# Patient Record
Sex: FEMALE | Race: WHITE | Employment: UNEMPLOYED | URBAN - METROPOLITAN AREA
[De-identification: names, ages, dates, MRNs, and addresses within clinical notes are randomized per-mention and may not be internally consistent; named-entity substitution may affect disease eponyms.]

---

## 2017-08-28 ENCOUNTER — HOSPITAL ENCOUNTER (OUTPATIENT)
Dept: MAMMOGRAPHY | Age: 62
Discharge: HOME | End: 2017-08-28
Attending: OBSTETRICS & GYNECOLOGY
Payer: COMMERCIAL

## 2017-08-28 DIAGNOSIS — Z12.31 VISIT FOR SCREENING MAMMOGRAM: ICD-10-CM

## 2017-08-28 PROCEDURE — 77067 SCR MAMMO BI INCL CAD: CPT

## 2017-10-13 ENCOUNTER — HOSPITAL ENCOUNTER (OUTPATIENT)
Dept: ULTRASOUND IMAGING | Age: 62
Discharge: HOME | End: 2017-10-13
Attending: FAMILY MEDICINE
Payer: COMMERCIAL

## 2017-10-13 DIAGNOSIS — E04.1 THYROID NODULE: ICD-10-CM

## 2017-10-13 PROCEDURE — 76536 US EXAM OF HEAD AND NECK: CPT

## 2017-10-16 NOTE — PROGRESS NOTES
Per Dr. Karlos Souza: Nodule is stable. Still would be good idea to follow up with specialist for needle bx.  [address] [facility].- [facility]

## 2018-09-13 ENCOUNTER — HOSPITAL ENCOUNTER (OUTPATIENT)
Dept: MAMMOGRAPHY | Age: 63
Discharge: HOME | End: 2018-09-13
Attending: OBSTETRICS & GYNECOLOGY
Payer: COMMERCIAL

## 2018-09-13 DIAGNOSIS — Z12.31 VISIT FOR SCREENING MAMMOGRAM: ICD-10-CM

## 2018-09-13 PROCEDURE — 77067 SCR MAMMO BI INCL CAD: CPT

## 2018-09-21 NOTE — PROGRESS NOTES
Notify pt (my chart, letter or call and document in connect care) :  MMG is normal.  Continue monthly SBE, notify us if she has any concerns. Thanks.

## 2019-01-29 ENCOUNTER — HOSPITAL ENCOUNTER (OUTPATIENT)
Dept: MAMMOGRAPHY | Age: 64
Discharge: HOME | End: 2019-01-29
Attending: OBSTETRICS & GYNECOLOGY
Payer: COMMERCIAL

## 2019-01-29 DIAGNOSIS — N95.1 MENOPAUSAL STATE: ICD-10-CM

## 2019-01-29 DIAGNOSIS — Z87.39 HISTORY OF OSTEOPENIA: ICD-10-CM

## 2019-01-29 DIAGNOSIS — Z01.419 WELL WOMAN EXAM WITH ROUTINE GYNECOLOGICAL EXAM: ICD-10-CM

## 2019-01-29 PROCEDURE — 77080 DXA BONE DENSITY AXIAL: CPT

## 2019-02-21 NOTE — PROGRESS NOTES
Notify pt dexa shows  Osteopenia at spine and hips, spine -->2% decrease c/w last scan & hips 7% decrease c/w last dexa. R dexa 2021. Verify she is taking:  Calcium 1200 mg/d and vit D 800 -1000 IU/d, and qd weight bearing exercise, repeat dexa in 2 y. Results are not severe enough to warrant treatment h/w if she wants to see a rheumatologist for their opinion we can refer her. Thanks.

## 2019-04-29 ENCOUNTER — HOSPITAL ENCOUNTER (OUTPATIENT)
Dept: PHYSICAL THERAPY | Age: 64
Discharge: HOME | End: 2019-04-29
Attending: FAMILY MEDICINE
Payer: COMMERCIAL

## 2019-04-29 DIAGNOSIS — R10.32 LEFT GROIN PAIN: ICD-10-CM

## 2019-04-29 PROCEDURE — 97110 THERAPEUTIC EXERCISES: CPT

## 2019-04-29 PROCEDURE — 97162 PT EVAL MOD COMPLEX 30 MIN: CPT

## 2019-04-29 NOTE — PROGRESS NOTES
Deo Villa  : 1955  Payor: Angela Lisa / Plan: [address] HMO / Product Type: HMO /  [address] [place] [address] at [address] [place] [address]. [address] [facility] [address] [place] [address], [address] [facility], [address] [place] [address]  [phone]   [phone]                                                          Yanci Cummings MD      OUTPATIENT PHYSICAL THERAPY: Daily Treatment Note 2019 Visit Count:  1    Pre-treatment Symptoms/Complaints:  See initial evaluation  Pain: Initial:   0 Post Session:  0/10   Medications Last Reviewed:  2019   Updated Objective Findings:  see initial evaluation   TREATMENT:   THERAPEUTIC EXERCISE: (20 minutes):  Exercises per grid below to improve mobility, strength and balance. Required minimal visual, verbal and manual cues to promote proper body alignment and promote proper body posture. Progressed resistance and complexity of movement as indicated. Date:  19 Date:   Date:     Activity/Exercise Parameters Parameters Parameters   Patient education Treatment rational, expectations, HEP     Prone on elbows 3 min     Prone pressups 10 x     Cat/camel 15 x     Child pose 10 x     bridge 10 x     Clam shell SL 10 x ea           MANUAL THERAPY: ( minutes): Joint mobilization and Soft tissue mobilization was utilized and necessary because of the patient's restricted joint motion and restricted motion of soft tissue. [employer] Portal  Treatment/Session Summary:    · Response to Treatment:  No increase in pain or adverse reactions  · Communication/Consultation:  Faxed initial evaluation to MD  · Equipment provided today:  HEP. Recommendations/Intent for next treatment session: Next visit will focus on increase mobility left hip and lumbar spine, strengthening of gluteals, stretching of hip external rotators. Treatment Plan of Care Effective Dates: 19 TO 2019 (60 days).   Frequency/Duration: 2 times a week for 60 Days  Total Treatment Billable Duration:  39 Rx     Jaunita Bending, RT    Future Appointments   Date Time Provider Agus Peraza   5/14/2019 11:00 AM Denys Beckford MD [facility] HTF HTF   10/23/2019 10:40 AM Bette Fulton MD END BS ENDO   2/26/2020  9:30 AM HTF LAB RESOURCE [facility] HTF HTF   3/2/2020  9:45 AM Denys Beckford MD [facility] HTF HTF

## 2019-04-29 NOTE — THERAPY EVALUATION
Shobha Tarango  : 1955    Payor: Rakesh Luis / Plan: [employer] HMO / Product Type: HMO /    [address] [facility] at [address] [place] [address] [address] [place] [address]  [phone]   [phone]        OUTPATIENT PHYSICAL THERAPY:Initial Assessment 2019    ICD-10: Treatment Diagnosis: Pain in left hip (M25.552)  Muscle weakness (M62.81)  Left hip strain (S76.012S)              Precautions/Allergies:   Latex; Iodine; and Lidocaine-epinephrine   Fall Risk Score: 0 (? 5 = High Risk)  MD Orders: Eval and Treat  MEDICAL/REFERRING DIAGNOSIS:  Left groin pain [R10.32]   DATE OF ONSET: 6 months  REFERRING PHYSICIAN: Abad Thompson MD  RETURN PHYSICIAN APPOINTMENT: TBD by patient      INITIAL ASSESSMENT:  Shobha Tarango presents to physical therapy with decreased postural and hip/core strength, ROM, joint mobility, flexibility, functional mobility, and increased pain. Client states that the pain has become chronic in nature. Patient symptoms were produced with squatting, passive end range hip rotation and the Scour Test. Her symptoms could indicate a possible labral tear. Shobha Tarango will benefit from skilled physical therapy (medically necessary) to address above deficits affecting participation in basic ADLs and functional mobility/tolerance. Patient will benefit from manual therapeutic techniques (stretching, joint mobilizations, soft tissue mobilization/myofascial release), therapeutic exercises and activities, postural strengthening/education, and comprehensive home exercises program to address current impairments and functional limitations.        PROBLEM LIST (Impacting functional limitations):  · Decreased Strength  · Decreased ADL/Functional Activities  · Decreased Ambulation Ability/Technique  · Increased Pain  · Decreased Activity Tolerance  · Decreased Flexibility/Joint Mobility  · Decreased [place] with Home Exercise Program INTERVENTIONS PLANNED:  1. Patient Education  2. Home Exercise Program (HEP)  3. Manual Therapy  4. Neuromuscular Re-education/Strengthening  5. Range of Motion (ROM)  6. Therapeutic Activites  7. Therapeutic Exercise/Strengthening     TREATMENT PLAN:  Effective Dates: 4/29/19 TO 6/29/2019 (60 days). Frequency/Duration: 2 times a week for 60 Days  GOALS: (Goals have been discussed and agreed upon with patient.)  Short Term Goals 3 weeks  1. Marlin Bhagat will be independent with HEP for strength and ROM  2. Marlin Bhagat will participate in LE strengthening exercises for hip, knee, ankle with weight as appropriate for 3 sets of 10.  [address] will report <=4/10 pain with walking uphill and demonstrate minimal to no difficulty   4. Marlin Bhagat will demonstrate improvement of MMT from initial eval to 4/5 left LE in order to return to participate in ADLs with minimal to no difficulty. [address] will participate in static and dynamic balance activities for 5 minutes to help improve proprioception    6. Marlin Bhagat to increase lower extremity functional scale by 10 points to show improvement in areas of difficulty  Long Term Goals 8 weeks   1. Marlin Bhagat will be independent in HEP of stretching and strengthening   2. Marlin Bhagat will be able to perform a full squat with decreased compensation    3. Marlin Bhagat will ascend/descend 12 steps with reciprocal gait pattern without pain. 4. Marlin Bhagat to increase lower extremity functional scale by 20 points to show improvement in areas of difficulty  Rehabilitation Potential For Stated Goals: good  Regarding Glendy Branch's therapy, I certify that the treatment plan above will be carried out by a therapist or under their direction.   Thank you for this referral,  RT John     Referring Physician Signature: Earl Welch MD              Date HISTORY:   History of Present Injury/Illness (Reason for Referral):  Ms. Kourtney Kerns states that she has had a gradual onset of left groin pain starting about 6 months ago. The pain is increased with twisting motions over the left extremity, walking uphills, and stairs. Left hip pain limits her ability to perform a full squat. She states that the pain is sharp in nature and only lasts a few seconds. She denies any popping or clicking of the left hip. She does note that she has had intermittent low back pain for 15-20 years. She states that she \"throws her back out\" about 1-2 times a year. She denies parathesias, or bowel/bladder problems. >Present Symptoms (on day of evaluation)  · Pain; Currently= 0/10  · Best= 0/10  · Worst= 7/10  · Aggravating factors: twisting, walk up hills or stairs. · Relieving factors: avoiding the above      Past Medical History/Comorbidities:   Ms. Kourtney Kerns  has a past medical history of Anxiety, Dysplasia of cervix, Dysplasia of vagina, Enlarged thyroid (8/7/2016), H/O bone density study (1/16/2013), and H/O colonoscopy (2011). She also has no past medical history of Aneurysm ([address] Arrhythmia, Arthritis, Asthma, Autoimmune disease ([address] CAD (coronary artery disease), Cancer ([address] Chronic kidney disease, Chronic pain, Coagulation defects, COPD, Diabetes ([address] Difficult intubation, GERD (gastroesophageal reflux disease), Heart failure ([address] Hypertension, Liver disease, Malignant hyperthermia due to anesthesia, Morbid obesity ([address] Nausea & vomiting, Other ill-defined conditions(799.89), Pseudocholinesterase deficiency, Psychiatric disorder, PUD (peptic ulcer disease), Seizures ([address] Stroke ([address] Thromboembolus ([address] Unspecified adverse effect of anesthesia, or Unspecified sleep apnea.   Ms. Kourtney Kerns  has a past surgical history that includes hx appendectomy; pr breast surgery procedure unlisted; hx leep procedure (2011); hx breast biopsy; and hx colonoscopy (approximately 2011). Active Ambulatory Problems     Diagnosis Date Noted    Osteopenia 02/25/2014    Mild dysplasia of cervix 02/25/2014    Anxiety     Thyroid nodule 08/11/2016     Resolved Ambulatory Problems     Diagnosis Date Noted    No Resolved Ambulatory Problems     Past Medical History:   Diagnosis Date    Anxiety     Dysplasia of cervix     Dysplasia of vagina     Enlarged thyroid 8/7/2016    H/O bone density study 1/16/2013    H/O colonoscopy 2011     Social History/Living Environment:     Client states that she walks 4-5 miles, 4-5 times a week, also does spin      Social History     Socioeconomic History    Marital status:      Spouse name: Not on file    Number of children: Not on file    Years of education: Not on file    Highest education level: Not on file   Occupational History    Not on file   Social Needs    Financial resource strain: Not on file    Food insecurity:     Worry: Not on file     Inability: Not on file    Transportation needs:     Medical: Not on file     Non-medical: Not on file   Tobacco Use    Smoking status: Former Smoker     Packs/day: 1.00    Smokeless tobacco: Never Used   Substance and Sexual Activity    Alcohol use:  Yes     Alcohol/week: 3.5 oz     Types: 7 Glasses of wine per week    Drug use: Yes     Types: Marijuana    Sexual activity: Yes     Partners: Male   Lifestyle    Physical activity:     Days per week: Not on file     Minutes per session: Not on file    Stress: Not on file   Relationships    Social connections:     Talks on phone: Not on file     Gets together: Not on file     Attends Anabaptist service: Not on file     Active member of club or organization: Not on file     Attends meetings of clubs or organizations: Not on file     Relationship status: Not on file    Intimate partner violence:     Fear of current or ex partner: Not on file     Emotionally abused: Not on file     Physically abused: Not on file     Forced sexual activity: Not on file   Other Topics Concern    Not on file   Social History Narrative    1. Has a 24 yo son-works in finance, daughter is 24 yo, studying [employer] design (2013). 2.  Daughter, Indira Jefferson is my pt ([id number]). She moved to [place] for work (outside [employer]). 3.  cervical exam is tricky, Cervix is very short, angles down, posterior portion almost flush w/ the upper fornix, stenotic, pt v apprehensive w/SSE, had burning, stinging pain w/ lugols, premedicate w/ valium prior to procedure, had moderate discomfort and high anxiety during colpo. 4.  6/2010--->breast bx--->\"LEFT BREAST, CALCIFICATIONS AT 2:00, CORE BIOPSY\":  FIBROCYSTIC MASTOPATHY HAVING MODERATE INTRADUCTAL HYPERPLASIA AND MICROCALCIFICATIONS---->needs f/u left breast MMG in 6m        5. Last name \"Olive-kam-ee\"        6. Vit D 37 ( 2011)        7. Dr Mya Rodrigez manages LP/TSH. Derm Dr. Kevin Mccormack         Prior Level of Function/Work/Activity:  Patient states that she has stopped weight lifting at gym due to fear of aggravating the pain. Dominant Side:  right  Previous Treatment Approach:  medication  Other Clinical Tests:  none    Current Medications:    Current Outpatient Medications:     meloxicam (MOBIC) 7.5 mg tablet, Take 1-2 po qam prn for pain., Disp: 40 Tab, Rfl: 0    cyclobenzaprine (FLEXERIL) 10 mg tablet, Take 1 Tab by mouth nightly., Disp: 20 Tab, Rfl: 0    azelaic acid (FINACEA) 15 % topical gel, APPLY TO AFFECTED AREA EVERY DAY TO FACE, Disp: , Rfl: 6    zolpidem (AMBIEN) 10 mg tablet, Take 0.5-1 Tabs by mouth nightly as needed for Sleep. Max Daily Amount: 10 mg., Disp: 30 Tab, Rfl: 2    citalopram (CELEXA) 20 mg tablet, TAKE 1 TABLET DAILY, Disp: 90 Tab, Rfl: 3    fexofenadine (ALLEGRA) 180 mg tablet, Take 180 mg by mouth daily. , Disp: , Rfl:     EPINEPHrine (EPIPEN 2-MK) 0.3 mg/0.3 mL injection, 0.3 mL by IntraMUSCular route once as needed for up to 1 dose.  Dispense #1 pack of 2 pens, Disp: 2 Syringe, Rfl: 0    metroNIDAZOLE (METROGEL) 1 % topical gel, , Disp: , Rfl: 6        Ambulatory/Rehab Services H2 Model Falls Risk Assessment    Risk Factors:       No Risk Factors Identified Ability to Rise from Chair:       (0)  Ability to rise in a single movement    Falls Prevention Plan:       No modifications necessary   Total: (5 or greater = High Risk): 0    ©2010 [facility]. All Rights Reserved. [facility] [employer] Patent #[id number].  Federal Law prohibits the replication, distribution or use without written permission from [facility] [employer]       Date Last Reviewed:  4/29/2019   Number of Personal Factors/Comorbidities that affect the Plan of Care: 1-2: MODERATE COMPLEXITY   EXAMINATION:   Observation/Orthostatic Postural Assessment:  Anterior pelvic tilt  · Gait= normal  Palpation:  Assessed @ Initial Visit: Tenderness to pain deep palpation groin, hip flexor left  ROM: Assessed @ Initial Visit:  AROM/PROM         Joint: Eval Date:  4/30/19  Re-Assess Date:  Re-Assess Date:    Active ROM RIGHT LEFT RIGHT LEFT RIGHT LEFT   Knee Extension 0 0       Knee Flexion 135 135       Hip Flexion 110 100 pain endrange       Hip Abduction 45 40 adductor tightness       Prone Hip Extension w/ Knee bent  10 5       Hip IR/ER 35/45 30/30 pain complaints endranges       Lumbar flexion 62        Lumbar extension 12        Lumbar sidebending 25 25         Strength:     Eval Date:  4/30/19  Re-Assess Date:  Re-Assess Date:      RIGHT LEFT RIGHT LEFT RIGHT LEFT   Knee Flexion 5/5 5/5       Knee Extension 5/5 5/5       Hip Flexion 5/5 4/5       Hip Abduction 5-/5 4-/5       Hip Extension 5-/5 4-/5                    Special Tests: Assessed @ Initial Visit   · Dat's Test neg  · Scouring Test positive anterior hip pain   · KEVEN neg  · SLR neg    Neurological Screen: Patient demonstrates/reports no loss in sensation  Functional Mobility:    · Squat squat limited by 50%, shifts body weight to right  · Steps anterior left hip pain  Balance:    Single Leg Stance: R= 30/ L= 20 sec     Body Structures Involved:    1. Joints  2. Muscles  3. Ligaments Body Functions Affected:  1. Sensory/Pain  2. Neuromusculoskeletal  3. Movement Related Activities and Participation Affected:  1. Mobility  2. Self Care   Number of elements that affect the Plan of Care: 4+: HIGH COMPLEXITY   CLINICAL PRESENTATION:   Presentation: Evolving clinical presentation with changing clinical characteristics: MODERATE COMPLEXITY   CLINICAL DECISION MAKING:   Outcome Measure: Tool Used: Lower Extremity Functional Scale (LEFS)  Score:  Initial:69/80 Most Recent: X/80 (Date: -- )   Interpretation of Score: 20 questions each scored on a 5 point scale with 0 representing \"extreme difficulty or unable to perform\" and 4 representing \"no difficulty\". The lower the score, the greater the functional disability. 80/80 represents no disability. Minimal detectable change is 9 points      Medical Necessity:   · Skilled intervention continues to be required due to deficits and impairments seen upon initial evaluation affecting patient's participation in ADLs and functional tasks. Reason for Services/Other Comments:  · Patient continues to require skilled intervention due to deficits and impairments seen upon initial evaluation affecting patient's participation in ADLs and functional tasks. Use of outcome tool(s) and clinical judgement create a POC that gives a: Questionable prediction of patient's progress: MODERATE COMPLEXITY   See treatment note for associated treatment provided today.     Future Appointments   Date Time Provider Agus Peraza   4/29/2019  9:15 AM Nicola Frederick, RT [facility]   5/14/2019 11:00 AM MD CARLIE Proctor HTF HTF   10/23/2019 10:40 AM Aranza Rodriguez MD END BS ENDO   2/26/2020  9:30 AM HTF LAB RESOURCE [facility] HTF HTF   3/2/2020  9:45 AM Breana Ptael MD [facility] HTF HTF       Total Treatment Duration:       RT Teresa remember to save as eval note

## 2019-05-02 ENCOUNTER — HOSPITAL ENCOUNTER (OUTPATIENT)
Dept: PHYSICAL THERAPY | Age: 64
Discharge: HOME | End: 2019-05-02
Attending: FAMILY MEDICINE
Payer: COMMERCIAL

## 2019-05-02 PROCEDURE — 97140 MANUAL THERAPY 1/> REGIONS: CPT

## 2019-05-02 PROCEDURE — 97110 THERAPEUTIC EXERCISES: CPT

## 2019-05-02 NOTE — PROGRESS NOTES
Disha Stuart  : 1955  Payor: Ravi Blanchard / Plan: [employer] HMO / Product Type: HMO /  Chris Omar at [address] [place] [address], [facility], [address] [place] [address]  [phone]   [phone]                                                          Stephanie Nazario MD      OUTPATIENT PHYSICAL THERAPY: Daily Treatment Note 2019 Visit Count:  2    Pre-treatment Symptoms/Complaints:  Pt said that she walked yesterday and she only had 3 instances of sharp pain in her groin. Pain: Initial:   0 Post Session:  0/10   Medications Last Reviewed:  2019   Updated Objective Findings:  see initial evaluation   TREATMENT:   THERAPEUTIC EXERCISE: 15 minutes):  Exercises per grid below to improve mobility, strength and balance. Required minimal visual, verbal and manual cues to promote proper body alignment and promote proper body posture. Progressed resistance and complexity of movement as indicated. Date:  19 Date:  19 Date:     Activity/Exercise Parameters Parameters Parameters   Patient education Treatment rational, expectations, HEP     Prone on elbows 3 min 3 min    Prone pressups 10 x 10 x    Cat/camel 15 x 10 x    Child pose 10 x 10 x    bridge 10 x 10 x 10 sec hold    Clam shell SL 10 x ea     Stair hip flexor stretch   5 x          MANUAL THERAPY: (30 minutes): Joint mobilization and Soft tissue mobilization was utilized and necessary because of the patient's restricted joint motion and restricted motion of soft tissue.     Date:  19 Date:   Date:     Activity/Exercise Parameters Parameters Parameters   PA glides lumbar spine Grade 2-3     Hip traction w/ belt left 5 min     Anterior glide left hip  Grade 3     Post glide left hip Grade 3                          [employer] Portal  Treatment/Session Summary:    · Response to Treatment:  No increase in pain or adverse reactions  · Communication/Consultation:  Add resistance bands to clam shells and bridges, add hip flexor stretching 2 x day  · Equipment provided today:  HEP. Recommendations/Intent for next treatment session: Next visit will focus on increase mobility left hip and lumbar spine, strengthening of gluteals, stretching of hip external rotators. Treatment Plan of Care Effective Dates: 4/29/19 TO 6/29/2019 (60 days).   Frequency/Duration: 2 times a week for 60 Days  Total Treatment Billable Duration:  45 Rx  PT Patient Time In/Time Out  Time In: 0938  Time Out: [address], RT    Future Appointments   Date Time Provider Agus Peraza   5/6/2019  8:30 AM PapenfussDeseandene Landing, RT [facility]   5/9/2019  9:15 AM Papenfuss, Jadene Landing, [facility]   5/13/2019  9:15 AM Papenfuss, Jadene Landing, RT [facility]   5/14/2019 11:00 AM Nataly Bolivar MD [facility] HTF HTF   5/16/2019  9:15 AM Papenfuss, Jadene Landing, RT [facility]   5/20/2019  9:15 AM Papenfuss, Jadene Landing, RT [facility]   5/23/2019  9:45 AM Papenfuss, Jadene Landing, RT [facility]   5/28/2019  9:30 AM Papenfuss, Jadene Landing, RT [facility]   5/30/2019  9:30 AM Papenfumustapha Jadene Landing, RT [facility]   10/23/2019 10:40 AM Trista Schmitt MD END BS ENDO   2/26/2020  9:30 AM HTF LAB RESOURCE [facility] HTF HTF   3/2/2020  9:45 AM Nataly Bolivar MD [facility] HTF HTF

## 2019-05-06 ENCOUNTER — HOSPITAL ENCOUNTER (OUTPATIENT)
Dept: PHYSICAL THERAPY | Age: 64
Discharge: HOME | End: 2019-05-06
Attending: FAMILY MEDICINE
Payer: COMMERCIAL

## 2019-05-06 PROCEDURE — 97140 MANUAL THERAPY 1/> REGIONS: CPT

## 2019-05-06 PROCEDURE — 97110 THERAPEUTIC EXERCISES: CPT

## 2019-05-06 NOTE — PROGRESS NOTES
Levi Forward  : 1955  Payor: Yvon Soriano / Plan:  [employer] HMO / Product Type: HMO /  [employer] at [address] [place] [address]. [address] [facility] [address] [place] [address], [facility], [address] [place] [address]  [phone]   [phone]                                                          Harriet Rasmussen MD      OUTPATIENT PHYSICAL THERAPY: Daily Treatment Note 2019 Visit Count:  3    Pre-treatment Symptoms/Complaints:  Pt said doing ex as instructed. No change in sx from last visit. Pain: Initial:   0 Post Session:  0/10   Medications Last Reviewed:  2019   Updated Objective Findings:  see initial evaluation   TREATMENT:   THERAPEUTIC EXERCISE: 30 minutes):  Exercises per grid below to improve mobility, strength and balance. Required minimal visual, verbal and manual cues to promote proper body alignment and promote proper body posture. Progressed resistance and complexity of movement as indicated. Date:  19 Date:  19 Date:  19   Activity/Exercise Parameters Parameters Parameters   Patient education Treatment rational, expectations, HEP  Relaxation tech   Prone on elbows 3 min 3 min 3 min   Prone pressups 10 x 10 x 10 x   Cat/camel 15 x 10 x 5 x   Child pose 10 x 10 x 10 x   bridge 10 x 10 x 10 sec hold    Clam shell SL 10 x ea     Stair hip flexor stretch   5 x    Side-stepping w/ band   4 x 30 ft w/ pink band around knees   Half-kneeling hip flexor stretch   10 x ea   Tall kneeling w/ shoulder flex   10 x   Deep breathing ex    5 min         MANUAL THERAPY: (15 minutes): Joint mobilization and Soft tissue mobilization was utilized and necessary because of the patient's restricted joint motion and restricted motion of soft tissue.     Date:  19 Date:  19 Date:     Activity/Exercise Parameters Parameters Parameters   PA glides lumbar spine Grade 2-3 Grade 3    Hip traction w/ belt left 5 min     Anterior glide left hip  Grade 3 Grade 3    Post glide left hip Grade 3  Grade 3                        MedBridge Portal  Treatment/Session Summary:    · Response to Treatment:  Upper chest breathing pattern, difficulty with hip flexor stretching due to muscle guarding  · Communication/Consultation:  Deep breathing during ex, would benefit from yoga classes. · Equipment provided today:  HEP. Recommendations/Intent for next treatment session: Next visit will focus on increase mobility left hip and lumbar spine, strengthening of gluteals, stretching of hip external rotators. Treatment Plan of Care Effective Dates: 4/29/19 TO 6/29/2019 (60 days).   Frequency/Duration: 2 times a week for 60 Days  Total Treatment Billable Duration:  45 Rx  PT Patient Time In/Time Out  Time In: 0833  Time Out: 17[address] [facility], RT    Future Appointments   Date Time Provider Agus Peraza   5/9/2019  9:15 AM Rodri Frederick Lloyd, RT [facility]   5/13/2019  9:15 AM Papenfuss, Rodri Lloyd, RT [facility]   5/14/2019 11:00 AM MD CARLIE Patton HTF HTF   5/16/2019  9:15 AM Papenfumustapha, Rodri Lloyd, RT [facility]   5/20/2019  9:15 AM Papenfuss, Rodri Lloyd, RT [facility]   5/23/2019  9:45 AM Papenfuss, Rodri Lloyd, RT [facility]   5/28/2019  9:30 AM Papenfumustapha, Rodri Lloyd, RT [facility]   5/30/2019  9:30 AM PapenfuNegar lucianosco Lloyd, RT [facility]   10/23/2019 10:40 AM Misti Hernandez MD END BS ENDO   2/26/2020  9:30 AM HTF LAB RESOURCE [facility] HTF HTF   3/2/2020  9:45 AM MD CARLIE Patton HTF HTF

## 2019-05-09 ENCOUNTER — HOSPITAL ENCOUNTER (OUTPATIENT)
Dept: PHYSICAL THERAPY | Age: 64
Discharge: HOME | End: 2019-05-09
Attending: FAMILY MEDICINE
Payer: COMMERCIAL

## 2019-05-09 PROCEDURE — 97110 THERAPEUTIC EXERCISES: CPT

## 2019-05-09 PROCEDURE — 97140 MANUAL THERAPY 1/> REGIONS: CPT

## 2019-05-09 NOTE — PROGRESS NOTES
Lynne Grade  : 1955  Payor: Elvia Boles / Plan: [address] HMO / Product Type: HMO /  Evelin Collins at [address] Sidra Arguello, [address] [place] [address], [address] [place] [address]  [phone]   [phone]                                                          Tammy Zazueta MD      OUTPATIENT PHYSICAL THERAPY: Daily Treatment Note 2019 Visit Count:  4    Pre-treatment Symptoms/Complaints:  Pt went to a class yesterday rather than doing her exercises. She did them the day before. Pain: Initial:   0 Post Session:  0/10   Medications Last Reviewed:  2019   Updated Objective Findings: Pt was able to touch fingers to floor, still shifting to R during squat but improved w/ repetition    TREATMENT:   THERAPEUTIC EXERCISE: 30 minutes):  Exercises per grid below to improve mobility, strength and balance. Required minimal visual, verbal and manual cues to promote proper body alignment and promote proper body posture. Progressed resistance and complexity of movement as indicated.      Date:  19 Date:  19 Date:  19 Date  19   Activity/Exercise Parameters Parameters Parameters parameters   Patient education Treatment rational, expectations, HEP  Relaxation tech    Prone on elbows 3 min 3 min 3 min    Prone pressups 10 x 10 x 10 x 10 x    Cat/camel 15 x 10 x 5 x    Child pose 10 x 10 x 10 x    bridge 10 x 10 x 10 sec hold     Clam shell SL 10 x ea      Stair hip flexor stretch   5 x     Side-stepping w/ band   4 x 30 ft w/ pink band around knees 4 x 30 ft black band around ft   Half-kneeling hip flexor stretch   10 x ea    Tall kneeling w/ shoulder flex   10 x X 15 w/ band around hips and holding ball   Deep breathing ex    5 min    Figure 4 stretch     3 min   Piriformis stretch    3 min   Happy baby stretch    3 min   squat    Stick on shoulder x 5  Stick overhead x 5   Box squats    10 x 10\"   Band pull-aparts    20 x   Deadlifts    10 x MANUAL THERAPY: (15 minutes): Joint mobilization and Soft tissue mobilization was utilized and necessary because of the patient's restricted joint motion and restricted motion of soft tissue. Date:  5/2/19 Date:  5/6/19 Date:     Activity/Exercise Parameters Parameters Parameters   PA glides lumbar spine Grade 2-3 Grade 3 Grade 3   Hip traction w/ belt left 5 min     Anterior glide left hip  Grade 3 Grade 3 Grade 3   Post glide left hip Grade 3  Grade 3 Grade 3                       MedBridge Portal  Treatment/Session Summary:    · Response to Treatment:  Pt's squat is limited by weakness in her glutes and a lack of back extension, which is leading to compensatory valgus at her knees and significant trunk forward-bending at the end of the motion. The weakness is also causing her difficulty with standing from a low-squatting position. · Communication/Consultation:  Deep breathing during ex, planning to attend yoga classes. · Equipment provided today:  HEP. Recommendations/Intent for next treatment session: Next visit will focus on increase mobility left hip and lumbar spine, strengthening of gluteals, stretching of hip external rotators. Treatment will also continue to address the pt's squatting technique and ability to move from squatting to standing. Treatment Plan of Care Effective Dates: 4/29/19 TO 6/29/2019 (60 days).   Frequency/Duration: 2 times a week for 60 Days  Total Treatment Billable Duration:  45 Rx  PT Patient Time In/Time Out  Time In: 9014  Time Out: 15[address] [facility] [address], RT    Future Appointments   Date Time Provider Agus Peraza   5/13/2019  9:15 AM Michael Frederick, RT [facility]   5/14/2019 11:00 AM Milton Bolivar MD [facility] HTF HTF   5/16/2019  9:15 AM Michael Frederick, RT [facility]   5/20/2019  9:15 AM Michael Frederick, RT [facility]   5/23/2019  9:45 AM Michael Frederick, RT [facility]   5/28/2019  9:30 AM Michael Frederick, RT [facility]   5/30/2019  9:30 AM Nba Frederick Para, RT [facility]   10/23/2019 10:40 AM Earl Norris MD END BS ENDO   2/26/2020  9:30 AM HTF LAB RESOURCE [facility]F HTF   3/2/2020  9:45 AM Stephanie Nazario MD [facility] HTF HTF

## 2019-05-13 ENCOUNTER — HOSPITAL ENCOUNTER (OUTPATIENT)
Dept: PHYSICAL THERAPY | Age: 64
Discharge: HOME | End: 2019-05-13
Attending: FAMILY MEDICINE
Payer: COMMERCIAL

## 2019-05-13 PROCEDURE — 97140 MANUAL THERAPY 1/> REGIONS: CPT

## 2019-05-13 PROCEDURE — 97110 THERAPEUTIC EXERCISES: CPT

## 2019-05-13 NOTE — PROGRESS NOTES
Lynne Grade  : 1955  Payor: Elvia Boles / Plan: [address] HMO / Product Type: HMO /  [address] at [address]. [address] [facility], [facility], [address] [place] [address] Road  [phone]   [phone]                                                          Tammy Zazueta MD      OUTPATIENT PHYSICAL THERAPY: Daily Treatment Note 2019 Visit Count:  5    Pre-treatment Symptoms/Complaints:  Pt  Pain: Initial:   0 Post Session:  0/10   Medications Last Reviewed:  2019   Updated Objective Findings: Pt was able to touch fingers to floor, still mildly shifting to R during squat but improved w/ repetition    TREATMENT:   THERAPEUTIC EXERCISE: 30 minutes):  Exercises per grid below to improve mobility, strength and balance. Required minimal visual, verbal and manual cues to promote proper body alignment and promote proper body posture. Progressed resistance and complexity of movement as indicated.      Date:  19 Date:  19 Date:  19 Date  19 Date  19   Activity/Exercise Parameters Parameters Parameters parameters parameters   Patient education Treatment rational, expectations, HEP  Relaxation tech     Prone on elbows 3 min 3 min 3 min     Prone pressups 10 x 10 x 10 x 10 x     Cat/camel 15 x 10 x 5 x  10 x   Child pose 10 x 10 x 10 x     bridge 10 x 10 x 10 sec hold      Clam shell SL 10 x ea       Stair hip flexor stretch   5 x   5 x   Side-stepping w/ band   4 x 30 ft w/ pink band around knees 4 x 30 ft black band around ft 4 x 30    Half-kneeling hip flexor stretch   10 x ea     Tall kneeling w/ shoulder flex   10 x X 15 w/ band around hips and holding ball    Deep breathing ex    5 min     Figure 4 stretch     3 min    Piriformis stretch    3 min 3 min    Happy baby stretch    3 min 3 min    squat    Stick on shoulder x 5  Stick overhead x 5 Front squat 10, overhead reach 10 x   Box squats    10 x 10\"  2 x 10 gradual dec in height to 12 in   Band pull-aparts    20 x    Deadlifts    10 x     4 pt to downward dog, to frog to stand up     10 min series                         MANUAL THERAPY: (15 minutes): Joint mobilization and Soft tissue mobilization was utilized and necessary because of the patient's restricted joint motion and restricted motion of soft tissue. Date:  5/2/19 Date:  5/6/19 Date:  5/13/19   Activity/Exercise Parameters Parameters Parameters   PA glides lumbar spine Grade 2-3 Grade 3 Grade 3   Hip traction w/ belt left 5 min     Anterior glide left hip  Grade 3 Grade 3 Grade 3   Post glide left hip Grade 3  Grade 3 Grade 3                       MedBridge Portal  Treatment/Session Summary:    · Response to Treatment:  Pt's squat is limited by weakness in her glutes and a lack of back extension, which is leading to compensatory valgus at her knees and significant trunk forward-bending at the end of the motion. The weakness is also causing her difficulty with standing from a low-squatting position. · Communication/Consultation:  Deep breathing during ex, planning to attend yoga classes. · Equipment provided today:  HEP. Recommendations/Intent for next treatment session: Next visit will focus on increase mobility left hip and lumbar spine, strengthening of gluteals, stretching of hip external rotators. Treatment will also continue to address the pt's squatting technique and ability to move from squatting to standing. Treatment Plan of Care Effective Dates: 4/29/19 TO 6/29/2019 (60 days).   Frequency/Duration: 2 times a week for 60 Days  Total Treatment Billable Duration:  45 Rx  PT Patient Time In/Time Out  Time In: 0521  Time Out: RT Ynes    Future Appointments   Date Time Provider Agus Peraza   5/14/2019 11:00 AM Lydia Rankin MD [facility] HTF F   5/16/2019  9:15 AM Pato Frederick, RT [facility]   5/20/2019  9:15 AM Pato Frederick, RT [facility]   5/23/2019  9:45 AM Denise Frederick, RT [facility]   5/28/2019  9:30 AM Denise Frederick, RT [facility]   5/30/2019  9:30 AM [facility], [facility]   10/23/2019 10:40 AM Hunter Cantu MD END BS ENDO   2/26/2020  9:30 AM HTF LAB RESOURCE [facility]F HTF   3/2/2020  9:45 AM Cosme Augustine MD [facility] HTF HTF

## 2019-05-16 ENCOUNTER — HOSPITAL ENCOUNTER (OUTPATIENT)
Dept: PHYSICAL THERAPY | Age: 64
Discharge: HOME | End: 2019-05-16
Attending: FAMILY MEDICINE
Payer: COMMERCIAL

## 2019-05-16 PROCEDURE — 97110 THERAPEUTIC EXERCISES: CPT

## 2019-05-16 PROCEDURE — 97140 MANUAL THERAPY 1/> REGIONS: CPT

## 2019-05-16 NOTE — PROGRESS NOTES
Whitley Rossi  : 1955  Payor: Veronika Laboy / Plan: [address] [employer]MO / Product Type: HMO /  Filomena Anglin at [facility]. [address] [place] [address], [facility], [address] [place] [address] Road  [phone]   [phone]                                                          Nataly Bolivar MD      OUTPATIENT PHYSICAL THERAPY: Daily Treatment Note 2019 Visit Count:  6    Pre-treatment Symptoms/Complaints:  Pt  Pain: Initial:   0 Post Session:  0/10   Medications Last Reviewed:  2019   Updated Objective Findings: Pt was able to touch fingers to floor, still mildly shifting to R during squat but improved w/ repetition    TREATMENT:   THERAPEUTIC EXERCISE: 30 minutes):  Exercises per grid below to improve mobility, strength and balance. Required minimal visual, verbal and manual cues to promote proper body alignment and promote proper body posture. Progressed resistance and complexity of movement as indicated.      Date:  19 Date:  19 Date:  19 Date  19 Date  19 Date  19   Activity/Exercise Parameters Parameters Parameters parameters parameters    Patient education Treatment rational, expectations, HEP  Relaxation tech      Prone on elbows 3 min 3 min 3 min      Prone pressups 10 x 10 x 10 x 10 x      Cat/camel 15 x 10 x 5 x  10 x    Child pose 10 x 10 x 10 x   10 x   bridge 10 x 10 x 10 sec hold       Clam shell SL 10 x ea        Stair hip flexor stretch   5 x   5 x    Side-stepping w/ band   4 x 30 ft w/ pink band around knees 4 x 30 ft black band around ft 4 x 30  4 x 30 black band   Half-kneeling hip flexor stretch   10 x ea      Tall kneeling w/ shoulder flex   10 x X 15 w/ band around hips and holding ball     Deep breathing ex    5 min      Figure 4 stretch     3 min  3 min   Piriformis stretch    3 min 3 min     Happy baby stretch    3 min 3 min  2 min   squat    Stick on shoulder x 5  Stick overhead x 5 Front squat 10, overhead reach 10 x 20 x with overhead reach gray band   Box squats    10 x 10\"  2 x 10 gradual dec in height to 12 in 12 inch   Band pull-aparts    20 x     Deadlifts    10 x      4 pt to downward dog, to frog to stand up     10 min series 10 min    Kneeling pulldowns      20# 30 x                  MANUAL THERAPY: (15 minutes): Joint mobilization and Soft tissue mobilization was utilized and necessary because of the patient's restricted joint motion and restricted motion of soft tissue. Date:  5/2/19 Date:  5/6/19 Date:  5/13/19 Date  5/16/19   Activity/Exercise Parameters Parameters Parameters    PA glides lumbar spine Grade 2-3 Grade 3 Grade 3 Grade 3   Hip traction w/ belt left 5 min   5 min   Anterior glide left hip  Grade 3 Grade 3 Grade 3 Grade 3   Post glide left hip Grade 3  Grade 3 Grade 3 Grade 3                          MedBridge Portal  Treatment/Session Summary:    · Response to Treatment:  Pt'shad no pain complains deep squat to 10 in   · Communication/Consultation:  Deep breathing during ex, planning to attend yoga classes. · Equipment provided today:  HEP. Recommendations/Intent for next treatment session: Next visit will focus on increase mobility left hip and lumbar spine, strengthening of gluteals, stretching of hip external rotators. Treatment will also continue to address the pt's squatting technique and ability to move from squatting to standing. Treatment Plan of Care Effective Dates: 4/29/19 TO 6/29/2019 (60 days).   Frequency/Duration: 2 times a week for 60 Days  Total Treatment Billable Duration:  45 Rx  PT Patient Time In/Time Out  Time In: 0923  Time Out: [address] [facility], RT    Future Appointments   Date Time Provider Agus Peraza   5/20/2019  9:15 AM Desi Frederick, RT [facility]   5/23/2019  9:45 AM Desi Frederick RT [facility]   5/28/2019  9:30 AM Desi Frederick, RT [facility]   5/30/2019  9:30 AM Desi Frederick, RT [facility]   5/30/2019  2:00 PM Seth Schulte MD [facility] HTF HTF   10/23/2019 10:40 AM Jagruti Guallpa MD END BS ENDO   2/26/2020  9:30 AM F LAB RESOURCE [facility]   3/2/2020  9:45 AM Seth Schulte MD [facility]

## 2019-05-20 ENCOUNTER — HOSPITAL ENCOUNTER (OUTPATIENT)
Dept: PHYSICAL THERAPY | Age: 64
Discharge: HOME | End: 2019-05-20
Attending: FAMILY MEDICINE
Payer: COMMERCIAL

## 2019-05-20 PROCEDURE — 97110 THERAPEUTIC EXERCISES: CPT

## 2019-05-20 PROCEDURE — 97140 MANUAL THERAPY 1/> REGIONS: CPT

## 2019-05-20 NOTE — PROGRESS NOTES
Agnieszka Melissa  : 1955  Payor: Kaci Velasco / Plan: [address] [employer] HMO / Product Type: HMO /  Kenna Sumrall at [address]. [facility], [address], [id number] [place] [address]  [phone]   [phone]                                                          Monica Gregory MD      OUTPATIENT PHYSICAL THERAPY: Daily Treatment Note 2019 Visit Count:  7    Pre-treatment Symptoms/Complaints:  Pt  Pain: Initial:   0 Post Session:  0/10   Medications Last Reviewed:  2019   Updated Objective Findings: hip ER L 32, R 38; IR L 50, R 50; MMT IR WNL, ER 4+/5, hip flex R 4+/5, L 4/5; lumbar flex 68, ext 22, R SB 25, L SB 25   TREATMENT:   THERAPEUTIC EXERCISE: 30 minutes):  Exercises per grid below to improve mobility, strength and balance. Required minimal visual, verbal and manual cues to promote proper body alignment and promote proper body posture. Progressed resistance and complexity of movement as indicated.      Date:  19 Date:  19 Date:  19 Date  19 Date  19 Date  19 Date  19   Activity/Exercise Parameters Parameters Parameters parameters parameters  parameters   Patient education Treatment rational, expectations, HEP  Relaxation tech       Prone on elbows 3 min 3 min 3 min       Prone pressups 10 x 10 x 10 x 10 x       Cat/camel 15 x 10 x 5 x  10 x     Child pose 10 x 10 x 10 x   10 x    bridge 10 x 10 x 10 sec hold        Clam shell SL 10 x ea         Stair hip flexor stretch   5 x   5 x     Side-stepping w/ band   4 x 30 ft w/ pink band around knees 4 x 30 ft black band around ft 4 x 30  4 x 30 black band    Half-kneeling hip flexor stretch   10 x ea    10 x   Tall kneeling w/ shoulder flex   10 x X 15 w/ band around hips and holding ball      Deep breathing ex    5 min       Figure 4 stretch     3 min  3 min 3 min   Piriformis stretch    3 min 3 min      Happy baby stretch    3 min 3 min  2 min    squat    Stick on shoulder x 5  Stick overhead x 5 Front squat 10, overhead reach 10 x 20 x with overhead reach gray band 20 x overhead reach gray band   Box squats    10 x 10\"  2 x 10 gradual dec in height to 12 in 12 inch With back squat 15 x red band above knees   Band pull-aparts    20 x      Deadlifts    10 x    20 x black band   4 pt to downward dog, to frog to stand up     10 min series 10 min     Kneeling pulldowns      20# 30 x 23# x 30   Dying bug        20 x red band above knees    Marching band above knees       10 x ea red band         MANUAL THERAPY: (15 minutes): Joint mobilization and Soft tissue mobilization was utilized and necessary because of the patient's restricted joint motion and restricted motion of soft tissue. Date:  5/2/19 Date:  5/6/19 Date:  5/13/19 Date  5/16/19 Date  5/20/19   Activity/Exercise Parameters Parameters Parameters     PA glides lumbar spine Grade 2-3 Grade 3 Grade 3 Grade 3                Hip traction w/ belt left 5 min   5 min 5 min    Anterior glide left hip  Grade 3 Grade 3 Grade 3 Grade 3 Grade 3    Post glide left hip Grade 3  Grade 3 Grade 3 Grade 3 Grade 3                             MedBridge Portal  Treatment/Session Summary:    · Response to Treatment:  Patient had no pain complaints  · Communication/Consultation: progression of HEP   · Equipment provided today:  HEP. Recommendations/Intent for next treatment session: Next visit will focus on increase  Mobility and hip strength   Treatment Plan of Care Effective Dates: 4/29/19 TO 6/29/2019 (60 days).   Frequency/Duration: 2 times a week for 60 Days  Total Treatment Billable Duration:  45 Rx     Robert No RT    Future Appointments   Date Time Provider Agus Peraza   5/23/2019  9:45 AM Lillian Frederick, RT [facility]   5/28/2019  9:30 AM Lillian Frederick, RT [facility]   5/30/2019  9:30 AM Lillian Frederick, RT [facility]   5/30/2019  2:00 PM Earl Welch MD [facility] HTF HTF 10/23/2019 10:40 AM Misti Hernandez MD END BS ENDO   2/26/2020  9:30 AM HTF LAB RESOURCE [facility] HTF HTF   3/2/2020  9:45 AM Zuly Cooley MD [facility] HTF HTF

## 2019-05-23 ENCOUNTER — HOSPITAL ENCOUNTER (OUTPATIENT)
Dept: PHYSICAL THERAPY | Age: 64
Discharge: HOME | End: 2019-05-23
Attending: FAMILY MEDICINE
Payer: COMMERCIAL

## 2019-05-23 PROCEDURE — 97110 THERAPEUTIC EXERCISES: CPT

## 2019-05-23 NOTE — PROGRESS NOTES
Katiuska Johnston  : 1955  Payor: Alejandra Argue / Plan: [address] HMO / Product Type: HMO /  January Risen at [address]. [facility], [address] [place] [address], [facility], [address] [place] [address]  [phone]   [phone]                                                          Denys Beckford MD      OUTPATIENT PHYSICAL THERAPY: Daily Treatment Note 2019 Visit Count:  8    Pre-treatment Symptoms/Complaints:  Pt  Pain: Initial:   0 Post Session:  0/10   Medications Last Reviewed:  2019   Updated Objective Findings: hip ER L 32, R 38; IR L 50, R 50; MMT IR WNL, ER 4+/5, hip flex R 4+/5, L 4/5; lumbar flex 68, ext 22, R SB 25, L SB 25   TREATMENT:   THERAPEUTIC EXERCISE: 45 minutes):  Exercises per grid below to improve mobility, strength and balance. Required minimal visual, verbal and manual cues to promote proper body alignment and promote proper body posture. Progressed resistance and complexity of movement as indicated.      Date:  19 Date:  19 Date:  19 Date  19 Date  19 Date  19 Date  19 Date   19   Activity/Exercise Parameters Parameters Parameters parameters parameters  parameters    Patient education Treatment rational, expectations, HEP  Relaxation tech        Prone on elbows 3 min 3 min 3 min        Prone pressups 10 x 10 x 10 x 10 x        Cat/camel 15 x 10 x 5 x  10 x      Child pose 10 x 10 x 10 x   10 x  10 x   bridge 10 x 10 x 10 sec hold         Clam shell SL 10 x ea          Stair hip flexor stretch   5 x   5 x      Side-stepping w/ band   4 x 30 ft w/ pink band around knees 4 x 30 ft black band around ft 4 x 30  4 x 30 black band  Pink around ankles 30 ft x 2   Half-kneeling hip flexor stretch   10 x ea    10 x    Tall kneeling w/ shoulder flex   10 x X 15 w/ band around hips and holding ball       Deep breathing ex    5 min        Figure 4 stretch     3 min  3 min 3 min 3 min   Piriformis stretch    3 min 3 min       Happy baby stretch    3 min 3 min  2 min  2 min   squat    Stick on shoulder x 5  Stick overhead x 5 Front squat 10, overhead reach 10 x 20 x with overhead reach gray band 20 x overhead reach gray band 20 x   Box squats    10 x 10\"  2 x 10 gradual dec in height to 12 in 12 inch With back squat 15 x red band above knees 20 x   Band pull-aparts    20 x       Deadlifts    10 x    20 x black band SRDL 15 lbs 10 x ea,    4 pt to downward dog, to frog to stand up     10 min series 10 min      Kneeling pulldowns      20# 30 x 23# x 30 23# 3 x 8   Dying bug        20 x red band above knees     Marching band above knees       10 x ea red band 20 x ea orange          MANUAL THERAPY: (5 minutes): Joint mobilization and Soft tissue mobilization was utilized and necessary because of the patient's restricted joint motion and restricted motion of soft tissue. Date:  5/2/19 Date:  5/6/19 Date:  5/13/19 Date  5/16/19 Date  5/20/19 Date  5/23/19   Activity/Exercise Parameters Parameters Parameters      PA glides lumbar spine Grade 2-3 Grade 3 Grade 3 Grade 3              Grade 3   Hip traction w/ belt left 5 min   5 min 5 min     Anterior glide left hip  Grade 3 Grade 3 Grade 3 Grade 3 Grade 3     Post glide left hip Grade 3  Grade 3 Grade 3 Grade 3 Grade 3                                 MedBridge Portal  Treatment/Session Summary:    · Response to Treatment:  Patient had no pain complaints  · Communication/Consultation: progression of HEP   · Equipment provided today:  HEP. Recommendations/Intent for next treatment session: Next visit will focus on increase  Mobility and hip strength   Treatment Plan of Care Effective Dates: 4/29/19 TO 6/29/2019 (60 days).   Frequency/Duration: 2 times a week for 60 Days  Total Treatment Billable Duration:  45 Rx  PT Patient Time In/Time Out  Time In: 0951  Time Out: 1430 [facility],     Future Appointments   Date Time Provider Agus Peraza   5/28/2019  9:30 AM Francisco DE LEÓN, RT [facility]   5/30/2019  9:30 AM Marisa Frederick, RT [facility]   5/30/2019  2:00 PM Desmond Fonseca MD [facility] HTF   10/23/2019 10:40 AM Khloe Khan MD END BS ENDO   2/26/2020  9:30 AM HTF LAB RESOURCE [facility] HTF HTF   3/2/2020  9:45 AM Desmond Fonseca MD [facility] HTF HTF

## 2019-05-28 ENCOUNTER — HOSPITAL ENCOUNTER (OUTPATIENT)
Dept: PHYSICAL THERAPY | Age: 64
Discharge: HOME | End: 2019-05-28
Attending: FAMILY MEDICINE
Payer: COMMERCIAL

## 2019-05-28 NOTE — PROGRESS NOTES
Eliane Flores  : 1955  Payor: Amairani Call / Plan: [address] HMO / Product Type: HMO /  [address] [facility] at [address] [place] [address]. [facility], [address] [place] [address]  [phone]   [phone]                                                          Nina Figueredo MD      OUTPATIENT PHYSICAL THERAPY: Daily Treatment Note 2019 Visit Count:  9    Pre-treatment Symptoms/Complaints:  Pt  Pain: Initial:   0 Post Session:  0/10   Medications Last Reviewed:  2019   Updated Objective Findings: hip ER L 32, R 38; IR L 50, R 50; MMT IR WNL, ER 4+/5, hip flex R 4+/5, L 4/5; lumbar flex 68, ext 22, R SB 25, L SB 25   TREATMENT:   THERAPEUTIC EXERCISE: 45 minutes):  Exercises per grid below to improve mobility, strength and balance. Required minimal visual, verbal and manual cues to promote proper body alignment and promote proper body posture. Progressed resistance and complexity of movement as indicated.      Date:  19 Date:  19 Date:  19 Date  19 Date  19 Date  19 Date  19 Date   19 Date  19   Activity/Exercise Parameters Parameters Parameters parameters parameters  parameters     Patient education Treatment rational, expectations, HEP  Relaxation tech         Prone on elbows 3 min 3 min 3 min         Prone pressups 10 x 10 x 10 x 10 x      6 x   Cat/camel 15 x 10 x 5 x  10 x       Child pose 10 x 10 x 10 x   10 x  10 x 5 x   bridge 10 x 10 x 10 sec hold          Clam shell SL 10 x ea           Stair hip flexor stretch   5 x   5 x       Side-stepping w/ band   4 x 30 ft w/ pink band around knees 4 x 30 ft black band around ft 4 x 30  4 x 30 black band  Pink around ankles 30 ft x 2    Half-kneeling hip flexor stretch   10 x ea    10 x  5 x ea   Tall kneeling w/ shoulder flex   10 x X 15 w/ band around hips and holding ball        Deep breathing ex    5 min         Figure 4 stretch     3 min  3 min 3 min 3 min    Piriformis stretch    3 min 3 min        Happy baby stretch    3 min 3 min  2 min  2 min    squat    Stick on shoulder x 5  Stick overhead x 5 Front squat 10, overhead reach 10 x 20 x with overhead reach gray band 20 x overhead reach gray band 20 x 20 x   Box squats    10 x 10\"  2 x 10 gradual dec in height to 12 in 12 inch With back squat 15 x red band above knees 20 x 20 x   Band pull-aparts    20 x        Deadlifts    10 x    20 x black band SRDL 15 lbs 10 x ea,  30 # 2 x 10   4 pt to downward dog, to frog to stand up     10 min series 10 min    5 min   Kneeling pulldowns      20# 30 x 23# x 30 23# 3 x 8 23 # x 20, band assist at hips for extension   Dying bug        20 x red band above knees      Marching band above knees       10 x ea red band 20 x ea orange  20 x band at feet         MANUAL THERAPY: (5 minutes): Joint mobilization and Soft tissue mobilization was utilized and necessary because of the patient's restricted joint motion and restricted motion of soft tissue. Date:  5/2/19 Date:  5/6/19 Date:  5/13/19 Date  5/16/19 Date  5/20/19 Date  5/23/19   Activity/Exercise Parameters Parameters Parameters      PA glides lumbar spine Grade 2-3 Grade 3 Grade 3 Grade 3              Grade 3   Hip traction w/ belt left 5 min   5 min 5 min     Anterior glide left hip  Grade 3 Grade 3 Grade 3 Grade 3 Grade 3     Post glide left hip Grade 3  Grade 3 Grade 3 Grade 3 Grade 3                                 MedBridge Portal  Treatment/Session Summary:    · Response to Treatment:  Patient had no pain complaints  · Communication/Consultation: progression of HEP   · Equipment provided today:  HEP. Recommendations/Intent for next treatment session: Next visit will focus on increase  Mobility and hip strength, reevaluate plan for DC  Treatment Plan of Care Effective Dates: 4/29/19 TO 6/29/2019 (60 days).   Frequency/Duration: 2 times a week for 60 Days  Total Treatment Billable Duration:  45 Rx  PT Patient Time In/Time Out  Time In: 0932  Time Out: [address], RT    Future Appointments   Date Time Provider Agus Karmen   5/30/2019  9:30 AM Samuel Frederick, RT [facility]   5/30/2019  2:00 PM Nataly Bolivar MD [facility] HTF HTF   10/23/2019 10:40 AM Trista Schmitt MD END BS ENDO   2/26/2020  9:30 AM HTF LAB RESOURCE [facility] HTF HTF   3/2/2020  9:45 AM Nataly Bolivar MD [facility] HTF HTF

## 2019-05-30 ENCOUNTER — APPOINTMENT (OUTPATIENT)
Dept: PHYSICAL THERAPY | Age: 64
End: 2019-05-30
Attending: FAMILY MEDICINE
Payer: COMMERCIAL

## 2019-06-17 ENCOUNTER — HOSPITAL ENCOUNTER (OUTPATIENT)
Dept: MRI IMAGING | Age: 64
Discharge: HOME | End: 2019-06-17
Attending: FAMILY MEDICINE
Payer: COMMERCIAL

## 2019-06-17 DIAGNOSIS — M54.9 BACK PAIN WITH RADIATION: ICD-10-CM

## 2019-06-17 PROCEDURE — 72148 MRI LUMBAR SPINE W/O DYE: CPT

## 2019-06-18 NOTE — PROGRESS NOTES
Per Dr. Margaretta Spatz: Shows disc herniation and narrowing that is irritating nerve on left. This is likely causing symptom. Recommend follow up with POA. In the mean time can send in sterapred dose pack if has not already done this. - Notified via [address] message.

## 2019-11-04 ENCOUNTER — HOSPITAL ENCOUNTER (OUTPATIENT)
Dept: MAMMOGRAPHY | Age: 64
Discharge: HOME | End: 2019-11-04
Attending: OBSTETRICS & GYNECOLOGY
Payer: COMMERCIAL

## 2019-11-04 DIAGNOSIS — Z12.31 VISIT FOR SCREENING MAMMOGRAM: ICD-10-CM

## 2019-11-04 PROCEDURE — 77067 SCR MAMMO BI INCL CAD: CPT

## 2019-11-05 NOTE — THERAPY DISCHARGE
Charlene Villafana  : 1955    Payor: Kamar Can / Plan: [address] HMO/CHOICE PLUS/POS / Product Type: HMO /    Desmond Pickard at [facility]. [facility], [address] [place] [address], [id number] [place] [address]  [phone]   [phone]        OUTPATIENT PHYSICAL THERAPY DISCHARGE 2019    ICD-10: Treatment Diagnosis: Pain in left hip (M25.552)  Muscle weakness (M62.81)  Left hip strain (S76.012S)              Precautions/Allergies:   Latex; Iodine; and Lidocaine-epinephrine   Fall Risk Score: 0 (? 5 = High Risk)  MD Orders: Eval and Treat  MEDICAL/REFERRING DIAGNOSIS:  Left groin pain [R10.32]   DATE OF ONSET: 6 months  REFERRING PHYSICIAN: Desmond Fonseca MD  RETURN PHYSICIAN APPOINTMENT: TBD by patient      FINAL ASSESSMENT:  Charlene Villafana discharged having achieved majority of goals. ·     TREATMENT PLAN:    GOALS: (Goals have been discussed and agreed upon with patient.)  Short Term Goals 3 weeks GOALS MET   1. Charlene Villafana will be independent with HEP for strength and ROM  2. Charlene Villafana will participate in LE strengthening exercises for hip, knee, ankle with weight as appropriate for 3 sets of 10.  [address] will report <=4/10 pain with walking uphill and demonstrate minimal to no difficulty   4. Charlene Villafana will demonstrate improvement of MMT from initial eval to 4/5 left LE in order to return to participate in ADLs with minimal to no difficulty. [address] will participate in static and dynamic balance activities for 5 minutes to help improve proprioception    6. Charlene Villafana to increase lower extremity functional scale by 10 points to show improvement in areas of difficulty  Long Term Goals 8 weeks GOALS MET   1. Charlene Villafana will be independent in HEP of stretching and strengthening   2.  Charlene Villafana will be able to perform a full squat with decreased compensation 3. Vidal Coleman will ascend/descend 12 steps with reciprocal gait pattern without pain. 4. Vidal Coleman to increase lower extremity functional scale by 20 points to show improvement in areas of difficulty               EXAMINATION:   Observation/Orthostatic Postural Assessment:  Anterior pelvic tilt  · Gait= normal  Palpation:  Assessed @ Initial Visit: Tenderness to pain deep palpation groin, hip flexor left  ROM: Assessed @ Initial Visit:  AROM/PROM         Joint: Eval Date:  4/30/19  Re-Assess Date  5/28/19  Re-Assess Date:    Active ROM RIGHT LEFT RIGHT LEFT RIGHT LEFT   Knee Extension 0 0 0 0     Knee Flexion 135 135 135 135     Hip Flexion 110 100 pain endrange 110 110     Hip Abduction 45 40 adductor tightness 45 45     Prone Hip Extension w/ Knee bent  10 5       Hip IR/ER 35/45 30/30 pain complaints endranges 38/45 32/40 mild pain     Lumbar flexion 62  68      Lumbar extension 12  22      Lumbar sidebending 25 25 25 25       Strength:     Eval Date:  4/30/19  Re-Assess Date:  5/28/19  Re-Assess Date:      RIGHT LEFT RIGHT LEFT RIGHT LEFT   Knee Flexion 5/5 5/5 5 5     Knee Extension 5/5 5/5 5 5     Hip Flexion 5/5 4/5 5 4+     Hip Abduction 5-/5 4-/5 5- 4+     Hip Extension 5-/5 4-/5 5 5-                  Balance:    Single Leg Stance: R= 30/ L= 30 sec     CLINICAL PRESENTATION:   CLINICAL DECISION MAKING:   Outcome Measure: Tool Used: Lower Extremity Functional Scale (LEFS)  Score:  Initial:69/80 Most Recent: X/80 (Date: -- )   Interpretation of Score: 20 questions each scored on a 5 point scale with 0 representing \"extreme difficulty or unable to perform\" and 4 representing \"no difficulty\". The lower the score, the greater the functional disability. 80/80 represents no disability.   Minimal detectable change is 9 points      Medical Necessity:   · Skilled intervention continues to be required due to deficits and impairments seen upon initial evaluation affecting patient's participation in ADLs and functional tasks. Reason for Services/Other Comments:  · Patient continues to require skilled intervention due to deficits and impairments seen upon initial evaluation affecting patient's participation in ADLs and functional tasks. See treatment note for associated treatment provided today.     Future Appointments   Date Time Provider Agus Peraza   2/26/2020  9:30 AM F LAB RESOURCE [facility]   3/2/2020  9:45 AM Kimberly Gautam MD [facility]   11/2/2020  2:00 PM Ines Lopez MD END BS ENDO       Total Treatment Duration:       York Cage Papenfuss, RT           remember to save as eval note

## 2021-08-12 ENCOUNTER — TRANSCRIBE ORDER (OUTPATIENT)
Dept: SCHEDULING | Age: 66
End: 2021-08-12

## 2021-08-12 DIAGNOSIS — Z12.31 ENCOUNTER FOR SCREENING MAMMOGRAM FOR MALIGNANT NEOPLASM OF BREAST: Primary | ICD-10-CM

## 2021-09-17 ENCOUNTER — HOSPITAL ENCOUNTER (OUTPATIENT)
Dept: MAMMOGRAPHY | Age: 66
Discharge: HOME | End: 2021-09-17
Attending: OBSTETRICS & GYNECOLOGY
Payer: COMMERCIAL

## 2021-09-17 DIAGNOSIS — Z12.31 ENCOUNTER FOR SCREENING MAMMOGRAM FOR MALIGNANT NEOPLASM OF BREAST: ICD-10-CM

## 2021-09-17 PROCEDURE — 77063 BREAST TOMOSYNTHESIS BI: CPT

## 2021-09-27 ENCOUNTER — HOSPITAL ENCOUNTER (OUTPATIENT)
Dept: SURGERY | Age: 66
Discharge: HOME | End: 2021-09-27
Attending: ORTHOPAEDIC SURGERY
Payer: COMMERCIAL

## 2021-09-27 ENCOUNTER — HOSPITAL ENCOUNTER (OUTPATIENT)
Dept: REHABILITATION | Age: 66
Discharge: HOME | End: 2021-09-27
Payer: COMMERCIAL

## 2021-09-27 LAB
ALBUMIN SERPL-MCNC: 3.9 G/DL (ref 3.2–4.6)
ANION GAP SERPL CALC-SCNC: 5 MMOL/L (ref 7–16)
APTT PPP: 28 SEC (ref 24.1–35.1)
ATRIAL RATE: 66 BPM
BACTERIA SPEC CULT: NORMAL
BASOPHILS # BLD: 0 K/UL (ref 0–0.2)
BASOPHILS NFR BLD: 1 % (ref 0–2)
BUN SERPL-MCNC: 17 MG/DL (ref 8–23)
CALCIUM SERPL-MCNC: 9 MG/DL (ref 8.3–10.4)
CALCULATED P AXIS, ECG09: 43 DEGREES
CALCULATED R AXIS, ECG10: 45 DEGREES
CALCULATED T AXIS, ECG11: 50 DEGREES
CHLORIDE SERPL-SCNC: 106 MMOL/L (ref 98–107)
CO2 SERPL-SCNC: 30 MMOL/L (ref 21–32)
CREAT SERPL-MCNC: 0.65 MG/DL (ref 0.6–1)
DIAGNOSIS, 93000: NORMAL
DIFFERENTIAL METHOD BLD: ABNORMAL
EOSINOPHIL # BLD: 0.1 K/UL (ref 0–0.8)
EOSINOPHIL NFR BLD: 2 % (ref 0.5–7.8)
ERYTHROCYTE [DISTWIDTH] IN BLOOD BY AUTOMATED COUNT: 13.3 % (ref 11.9–14.6)
EST. AVERAGE GLUCOSE BLD GHB EST-MCNC: 111 MG/DL
GLUCOSE SERPL-MCNC: 106 MG/DL (ref 65–100)
HBA1C MFR BLD: 5.5 % (ref 4.2–6.3)
HCT VFR BLD AUTO: 42.2 % (ref 35.8–46.3)
HGB BLD-MCNC: 13.8 G/DL (ref 11.7–15.4)
IMM GRANULOCYTES # BLD AUTO: 0 K/UL (ref 0–0.5)
IMM GRANULOCYTES NFR BLD AUTO: 0 % (ref 0–5)
INR PPP: 1
LYMPHOCYTES # BLD: 1.8 K/UL (ref 0.5–4.6)
LYMPHOCYTES NFR BLD: 46 % (ref 13–44)
MCH RBC QN AUTO: 29.8 PG (ref 26.1–32.9)
MCHC RBC AUTO-ENTMCNC: 32.7 G/DL (ref 31.4–35)
MCV RBC AUTO: 91.1 FL (ref 79.6–97.8)
MONOCYTES # BLD: 0.4 K/UL (ref 0.1–1.3)
MONOCYTES NFR BLD: 9 % (ref 4–12)
NEUTS SEG # BLD: 1.6 K/UL (ref 1.7–8.2)
NEUTS SEG NFR BLD: 42 % (ref 43–78)
NRBC # BLD: 0 K/UL (ref 0–0.2)
P-R INTERVAL, ECG05: 182 MS
PLATELET # BLD AUTO: 318 K/UL (ref 150–450)
PMV BLD AUTO: 10.4 FL (ref 9.4–12.3)
POTASSIUM SERPL-SCNC: 4 MMOL/L (ref 3.5–5.1)
PROTHROMBIN TIME: 13.2 SEC (ref 12.6–14.5)
Q-T INTERVAL, ECG07: 426 MS
QRS DURATION, ECG06: 92 MS
QTC CALCULATION (BEZET), ECG08: 446 MS
RBC # BLD AUTO: 4.63 M/UL (ref 4.05–5.2)
SERVICE CMNT-IMP: NORMAL
SODIUM SERPL-SCNC: 141 MMOL/L (ref 136–145)
VENTRICULAR RATE, ECG03: 66 BPM
WBC # BLD AUTO: 3.9 K/UL (ref 4.3–11.1)

## 2021-09-27 PROCEDURE — 77030027138 HC INCENT SPIROMETER -A

## 2021-09-27 PROCEDURE — 36415 COLL VENOUS BLD VENIPUNCTURE: CPT

## 2021-09-27 PROCEDURE — 87641 MR-STAPH DNA AMP PROBE: CPT

## 2021-09-27 PROCEDURE — 85730 THROMBOPLASTIN TIME PARTIAL: CPT

## 2021-09-27 PROCEDURE — 83036 HEMOGLOBIN GLYCOSYLATED A1C: CPT

## 2021-09-27 PROCEDURE — 80307 DRUG TEST PRSMV CHEM ANLYZR: CPT

## 2021-09-27 PROCEDURE — 94760 N-INVAS EAR/PLS OXIMETRY 1: CPT

## 2021-09-27 PROCEDURE — 93005 ELECTROCARDIOGRAM TRACING: CPT

## 2021-09-27 PROCEDURE — 97161 PT EVAL LOW COMPLEX 20 MIN: CPT

## 2021-09-27 PROCEDURE — 85610 PROTHROMBIN TIME: CPT

## 2021-09-27 PROCEDURE — 80048 BASIC METABOLIC PNL TOTAL CA: CPT

## 2021-09-27 PROCEDURE — 82040 ASSAY OF SERUM ALBUMIN: CPT

## 2021-09-27 PROCEDURE — 85025 COMPLETE CBC W/AUTO DIFF WBC: CPT

## 2021-09-27 NOTE — PERIOP NOTES
Patient verified name and . Order for consent  found in EHR and matches case posting; patient verified. Type 3 surgery, joint camp assessment complete. Labs per surgeon: CBC,BMP, PT/PTT, Albumin, Hgb A1c ; results pending; Urine nicotine:  pending  Labs per anesthesia protocol: no additional  EKG:completed today per protocol and within anesthesia guidelines. Patient COVID test date 10/4/21 7459; Patient aware. The testing center [facility], [place]  and telephone number [phone] provided to the patient if not appointment found. MRSA/MSSA swab collected; pharmacy to review and dose antibiotic as appropriate. Hospital approved surgical skin cleanser and instructions to return bottle on DOS given per hospital policy. Patient provided with handouts including Guide to Surgery, Pain Management, Hand Hygiene, Blood Transfusion Education, and [facility] Brochure. Patient answered medical/surgical history questions at their best of ability. All prior to admission medications documented in [facility]. Original medication prescription bottle not visualized during patient appointment. Patient instructed to hold all vitamins 3 weeks prior to surgery and NSAIDS 5 days prior to surgery. Patient teach back successful and patient demonstrates knowledge of instruction.

## 2021-09-27 NOTE — PROGRESS NOTES
Eli Rudolph  : (89 y.o.) [address] at [place] [address].  [phone]       Physical Therapy Prehab Plan of Treatment and Evaluation Summary:2021    ICD-10: Treatment Diagnosis:   · Pain in Right Hip (M25.551)  · Stiffness of Right Hip, Not elsewhere classified (M25.651)  · Difficulty in walking, Not elsewhere classified (R26.2)  Precautions/Allergies:   Latex, Iodine, and Lidocaine-epinephrine  MEDICAL/REFERRING DIAGNOSIS:  Unilateral primary osteoarthritis, right hip [M16.11]  REFERRING PHYSICIAN: Cliff Portillo MD  DATE OF SURGERY: 10/6/21    Assessment:   Comments:  Pt. Plans to go home with  who is here today. She may need a left nery at some point. Her bedroom is upstairs. PROBLEM LIST (Impacting functional limitations):  Ms. Roderick Meek presents with the following right lower extremity(s) problems:  1. Strength  2. Range of Motion  3. Home Exercise Program  4. Pain   INTERVENTIONS PLANNED:  1. Home Exercise Program  2. Educational Discussion      TREATMENT PLAN: Effective Dates: 2021 TO 2021. Frequency/Duration: Patient to continue to perform home exercise program at least twice per day up until her surgery. GOALS: (Goals have been discussed and agreed upon with patient.)  Discharge Goals: Time Frame: 1 Day  1. Patient will demonstrate independence with a home exercise program designed to increase strength, range of motion and pain control to minimize functional deficits and optimize patient for total joint replacement. Rehabilitation Potential For Stated Goals: Good  Regarding Glendy Branch's therapy, I certify that the treatment plan above will be carried out by a therapist or under their direction.   Thank you for this referral,  Ceferino Vang, PT               HISTORY:   Present Symptoms:  Pain Intensity 1:  (8 at worst)  Pain Location 1: Hip   History of Present Injury/Illness (Reason for Referral):  Medical/Referring Diagnosis: Unilateral primary osteoarthritis, right hip [M16.11]   Past Medical History/Comorbidities:   Ms. Yashira Arguelles  has a past medical history of Anxiety, Dysplasia of cervix, Dysplasia of vagina, Enlarged thyroid (8/7/2016), H/O bone density study (1/16/2013), and H/O colonoscopy (2011). She also has no past medical history of Aneurysm ([address] Arrhythmia, Arthritis, Asthma, Autoimmune disease ([address] CAD (coronary artery disease), Cancer ([address] Chronic kidney disease, Chronic pain, Coagulation defects, COPD, Diabetes ([address] Difficult intubation, GERD (gastroesophageal reflux disease), Heart failure ([address] Hypertension, Liver disease, Malignant hyperthermia due to anesthesia, Morbid obesity ([address] Nausea & vomiting, Other ill-defined conditions(799.89), Pseudocholinesterase deficiency, Psychiatric disorder, PUD (peptic ulcer disease), Seizures ([address] Stroke ([address] Thromboembolus ([address] Unspecified adverse effect of anesthesia, or Unspecified sleep apnea. Ms. Yashira Arguelles  has a past surgical history that includes hx appendectomy; pr breast surgery procedure unlisted; hx leep procedure (2011); hx colonoscopy (approximately 2011); and hx breast biopsy (Left, 2010).   Social History/Living Environment:   Home Environment: Private residence  # Steps to Enter: 5  Rails to Enter: Yes  Hand Rails : Right  One/Two Story Residence: Two story  # of Interior Steps: 12  Interior Rails: Left  Lift Chair Available: No  Living Alone: No  Support Systems: Spouse/Significant Other  Patient Expects to be Discharged to[de-identified] [employer]  Current DME Used/Available at Home: None  Tub or Shower Type: Shower    Work/Activity:  retired  Dominant Side:  RIGHT  Current Medications:  See Pre-assessment nursing note   Number of Personal Factors/Comorbidities that affect the Plan of Care: 1-2: MODERATE COMPLEXITY   EXAMINATION:   ADLs (Current Functional Status):   Ambulation:  [x] Independent  [] Walk Indoors Only  [] Walk Outdoors  [] Use Assistive Device  [] Use Wheelchair Only Dressing:  [x] [address] from Someone for:  [] Sock/Shoes  [] Pants  [] Everything   Bathing/Showering:   [x] Independent  [] Requires Assistance from Someone  [] [address] Only Household Activities:  [x] Routine house and yard work  [] Light Housework Only  [] None   Observation/Orthostatic Postural Assessment:       ROM/Flexibility:   Gross Assessment: Yes  AROM: Generally decreased, functional (left LE, left hip limited)                       RLE Assessment  RLE Assessment (WDL): Exceptions to WDL (right hip 3/5)  RLE AROM  R Hip Flexion: 110  R Hip ABduction: 10  R Knee Extension: 0   Strength:   Gross Assessment: Yes  Strength: Generally decreased, functional (left LE)                  Functional Mobility:    Gross Assessment: Yes    Gait Description (WDL): Exceptions to WDL  Stand to Sit: Additional time, Independent  Sit to Stand: Independent, Additional time  Distance (ft): 200 Feet (ft)  Ambulation - Level of Assistance: Independent  Stance: Right decreased  Gait Abnormalities: Antalgic          Balance:    Sitting: Intact  Standing: Intact   Body Structures Involved:  1. Bones  2. Joints  3. Muscles  4. Ligaments Body Functions Affected:  1. Movement Related Activities and Participation Affected:  1. Mobility   Number of elements that affect the Plan of Care: 3: MODERATE COMPLEXITY   CLINICAL PRESENTATION:   Presentation: Stable and uncomplicated: LOW COMPLEXITY   CLINICAL DECISION MAKING:   Tool Used: Hip dysfunction and Osteoarthritis Outcome Score for Joint Replacement (HOOS, JR)  Score:  Initial: 12 (Interval: 52.965) 9/27/2021 Most Recent: TBD   Interpretation of Score: The HOOS, JR contains 6 items from the original HOOS survey. Items are coded from 0 to 4, none to extreme respectively.    Denny Timothy is scored by summing the raw response (range 0-24) and then converting it to an interval score using the table provided below. The interval score ranges from 0 to 100 where 0 represents total hip disability and 100 represents perfect hip health. Medical Necessity:   · Ms. Douglas Clinton is expected to optimize her lower extremity strength and ROM in preparation for joint replacement surgery. Reason for Services/Other Comments:  · Achieve baseline assesment of musculoskeletal system, functional mobility and home environment. , educate in PT HEP in preparation for surgery, educate in hospital plan of care. Use of outcome tool(s) and clinical judgement create a POC that gives a: Clear prediction of patient's progress: LOW COMPLEXITY   TREATMENT:   Treatment/Session Assessment:  Patient was instructed in PT- HEP to increase strength and ROM in LEs. Answered all questions. · Post session pain:  Hip pain  · Compliance with Program/Exercises: compliant most of the time.   Total Treatment Duration:  PT Patient Time In/Time Out  Time In: 1030  Time Out: [address], PT

## 2021-09-27 NOTE — PERIOP NOTES
PLEASE CONTINUE TAKING ALL PRESCRIPTION MEDICATIONS UP TO THE DAY OF SURGERY UNLESS OTHERWISE DIRECTED BELOW. DISCONTINUE all vitamins and supplements 21 days prior to surgery. DISCONTINUE Non-Steriodal Anti-Inflammatory (NSAIDS) such as Advil and Aleve 5 days prior to surgery. Home Medications to take  the day of surgery    Citalopram   Allegra        Home Medications   to Hold   Hold Aspirin for 5 days prior to surgery        Comments    Covid test 10/4/21 0835 @ [address] [place] [address]    On the day before surgery please take Acetaminophen 1000mg in the morning and then again before bed. You may substitute for Tylenol 650 mg. Please do not bring home medications with you on the day of surgery unless otherwise directed by your nurse. If you are instructed to bring home medications, please give them to your nurse as they will be administered by the nursing staff. If you have any questions, please call [facility] Debbi Mcallister [phone] or [address] [place] [address] [phone]. A copy of this note was provided to the patient for reference.

## 2021-09-28 LAB
COTININE UR QL SCN: NEGATIVE NG/ML
DRUG SCREEN COMMENT:, 753798: NORMAL

## 2021-09-29 VITALS
HEIGHT: 67 IN | SYSTOLIC BLOOD PRESSURE: 167 MMHG | HEART RATE: 64 BPM | BODY MASS INDEX: 22.91 KG/M2 | OXYGEN SATURATION: 96 % | DIASTOLIC BLOOD PRESSURE: 89 MMHG | WEIGHT: 146 LBS | TEMPERATURE: 98.2 F

## 2021-09-29 NOTE — PROGRESS NOTES
21 1030   Oxygen Therapy   O2 Sat (%) 98 %   Pulse via Oximetry 63 beats per minute   O2 Device None (Room air)   Pre-Treatment   Breath Sounds Bilateral Clear   Pre FEV1 (liters) 2.5 liters   % Predicted 90     Initial respiratory Assessment completed with pt. Pt was interviewed and evaluated in Joint camp prior to surgery. Patient ID:  Chelita Edwards  [id number]  71 y.o.  1955  Surgeon: Dr. Cali Kessler  Date of Surgery: The linked surgery was not found. Please check manually. Procedure: Total Right Hip Arthroplasty  Primary Care Physician: Javi Villafuerte [phone]  Specialists:     Pt taught proper COUGH technique  DIAPHRAGMATIC BREATHING EXERCISE INSTRUCTIONS GIVEN    History of smokin PPD FOR 20 YEARS  HX OF SMOKING MARIJUANA                 Quit date:         Secondhand smoke:DENIES    Past procedures with Oxygen desaturation or delayed awakening:DENIES    Past Medical History:   Diagnosis Date    Acne rosacea     Anxiety     Arthritis     Dysplasia of cervix     Dysplasia of vagina     Enlarged thyroid 2016    referred to ENT    H/O bone density study 2013    Osteopenia    H/O colonoscopy     per pt R due     Insomnia     Marijuana user     weekly    Seasonal allergic rhinitis         Respiratory history:DENIES SOB                                                                  Respiratory meds:  DENIES    FAMILY PRESENT:            STATES PT SNORES LOUDLY- HE HAS RECORDED HER  PAST SLEEP STUDY:                    DENIES  HX OF ANI:                                      DENIES  ANI assessment:                                               SLEEPS ON SIDE       &      BACK          PHYSICAL EXAM   Body mass index is 22.87 kg/m².    Visit Vitals  BP (!) 167/89 (BP 1 Location: Left upper arm, BP Patient Position: Sitting)   Pulse 64   Temp 98.2 °F (36.8 °C)   Ht 5' 7\" (1.702 m)   Wt 66.2 kg (146 lb)   SpO2 96%   BMI 22.87 kg/m²     Neck circumference:  35    cm    Loud snoring:                                                 YES            Witnessed apnea or wakening gasping or choking:        DENIES                                                                  ADMITS TO BEING TIRED  Awakens with headaches:                                       DENIES                                   [place] stage:  3                                   SACS score:3  Stop Bang   STOP-BANG  Does the patient snore loudly (louder than talking or loud enough to be heard through closed doors)?: Yes  Does the patient often feel tired, fatigued, or sleepy during the daytime, even after a \"good\" night's sleep?: Yes  Has anyone ever observed the patient stop breathing during their sleep? : No  Does the patient have or are they being treated for high blood pressure?: No  Is the patient's BMI greater than 35?: No  Is your neck circumference greater than 17 inches (Male) or 16 inches (Female)?: No  Is the patient older than 48?: Yes  Is the patient male?: No  ANI Score: 3  Has the patient been referred to Sleep Medicine?: No  Has the patient previously been diagnosed with Obstructive Sleep Apnea?: No                            CS [facility]  RESPIRATORY ASSESSMENT Q SHIFT     O2 PRN                                        Referrals:    Pt. Phone Number:

## 2021-09-29 NOTE — PERIOP NOTES
NICOTINE/COTININ[place]hester  Order: [id number]  Collected:  9/27/2021 10:43 Status:  Final result   0 Result Notes   Ref Range & Units 9/27/21 1043 Resulting Agency   Cotinine Screen, urine Qtktxe=450 ng/mL Negative  [facility] OTS RTP

## 2021-10-05 ENCOUNTER — ANESTHESIA EVENT (OUTPATIENT)
Dept: SURGERY | Age: 66
End: 2021-10-05
Payer: COMMERCIAL

## 2021-10-06 ENCOUNTER — HOSPITAL ENCOUNTER (OUTPATIENT)
Age: 66
Setting detail: OUTPATIENT SURGERY
Discharge: HOME HEALTH | End: 2021-10-06
Attending: ORTHOPAEDIC SURGERY | Admitting: ORTHOPAEDIC SURGERY
Payer: COMMERCIAL

## 2021-10-06 ENCOUNTER — ANESTHESIA (OUTPATIENT)
Dept: SURGERY | Age: 66
End: 2021-10-06
Payer: COMMERCIAL

## 2021-10-06 ENCOUNTER — APPOINTMENT (OUTPATIENT)
Dept: GENERAL RADIOLOGY | Age: 66
End: 2021-10-06
Attending: NURSE PRACTITIONER
Payer: COMMERCIAL

## 2021-10-06 ENCOUNTER — HOME HEALTH ADMISSION (OUTPATIENT)
Dept: HOME HEALTH SERVICES | Facility: HOME HEALTH | Age: 66
End: 2021-10-06
Payer: COMMERCIAL

## 2021-10-06 VITALS
DIASTOLIC BLOOD PRESSURE: 76 MMHG | TEMPERATURE: 98.2 F | RESPIRATION RATE: 16 BRPM | SYSTOLIC BLOOD PRESSURE: 142 MMHG | HEART RATE: 56 BPM | OXYGEN SATURATION: 99 %

## 2021-10-06 LAB
GLUCOSE BLD STRIP.AUTO-MCNC: 101 MG/DL (ref 65–100)
SERVICE CMNT-IMP: ABNORMAL

## 2021-10-06 PROCEDURE — 76210000027 HC REC RM PH II 3.5 TO 4 HR: Performed by: ORTHOPAEDIC SURGERY

## 2021-10-06 PROCEDURE — 74011250637 HC RX REV CODE- 250/637: Performed by: NURSE PRACTITIONER

## 2021-10-06 PROCEDURE — 97161 PT EVAL LOW COMPLEX 20 MIN: CPT

## 2021-10-06 PROCEDURE — 74011250636 HC RX REV CODE- 250/636: Performed by: NURSE PRACTITIONER

## 2021-10-06 PROCEDURE — 27130 TOTAL HIP ARTHROPLASTY: CPT | Performed by: ORTHOPAEDIC SURGERY

## 2021-10-06 PROCEDURE — 76060000034 HC ANESTHESIA 1.5 TO 2 HR: Performed by: ORTHOPAEDIC SURGERY

## 2021-10-06 PROCEDURE — 77030019557 HC ELECTRD VES SEAL MEDT -F: Performed by: ORTHOPAEDIC SURGERY

## 2021-10-06 PROCEDURE — 74011000250 HC RX REV CODE- 250: Performed by: NURSE ANESTHETIST, CERTIFIED REGISTERED

## 2021-10-06 PROCEDURE — 97530 THERAPEUTIC ACTIVITIES: CPT

## 2021-10-06 PROCEDURE — 77030033067 HC SUT PDO STRATFX SPIR J&J -B: Performed by: ORTHOPAEDIC SURGERY

## 2021-10-06 PROCEDURE — C1776 JOINT DEVICE (IMPLANTABLE): HCPCS | Performed by: ORTHOPAEDIC SURGERY

## 2021-10-06 PROCEDURE — 76010000162 HC OR TIME 1.5 TO 2 HR INTENSV-TIER 1: Performed by: ORTHOPAEDIC SURGERY

## 2021-10-06 PROCEDURE — 77030007880 HC KT SPN EPDRL BBMI -B: Performed by: ANESTHESIOLOGY

## 2021-10-06 PROCEDURE — 77030034479 HC ADH SKN CLSR PRINEO J&J -B: Performed by: ORTHOPAEDIC SURGERY

## 2021-10-06 PROCEDURE — 77030003665 HC NDL SPN BBMI -A: Performed by: ANESTHESIOLOGY

## 2021-10-06 PROCEDURE — C1713 ANCHOR/SCREW BN/BN,TIS/BN: HCPCS | Performed by: ORTHOPAEDIC SURGERY

## 2021-10-06 PROCEDURE — 77030002922 HC SUT FBRWRE ARTH -B: Performed by: ORTHOPAEDIC SURGERY

## 2021-10-06 PROCEDURE — 76210000006 HC OR PH I REC 0.5 TO 1 HR: Performed by: ORTHOPAEDIC SURGERY

## 2021-10-06 PROCEDURE — 74011250636 HC RX REV CODE- 250/636: Performed by: NURSE ANESTHETIST, CERTIFIED REGISTERED

## 2021-10-06 PROCEDURE — 82962 GLUCOSE BLOOD TEST: CPT

## 2021-10-06 PROCEDURE — 97535 SELF CARE MNGMENT TRAINING: CPT

## 2021-10-06 PROCEDURE — 77030018673: Performed by: ORTHOPAEDIC SURGERY

## 2021-10-06 PROCEDURE — 77030002933 HC SUT MCRYL J&J -A: Performed by: ORTHOPAEDIC SURGERY

## 2021-10-06 PROCEDURE — 97165 OT EVAL LOW COMPLEX 30 MIN: CPT

## 2021-10-06 PROCEDURE — 27130 TOTAL HIP ARTHROPLASTY: CPT | Performed by: NURSE PRACTITIONER

## 2021-10-06 PROCEDURE — 77030040922 HC BLNKT HYPOTHRM STRY -A: Performed by: ANESTHESIOLOGY

## 2021-10-06 PROCEDURE — 74011250636 HC RX REV CODE- 250/636: Performed by: ANESTHESIOLOGY

## 2021-10-06 PROCEDURE — 77030018836 HC SOL IRR NACL ICUM -A: Performed by: ORTHOPAEDIC SURGERY

## 2021-10-06 PROCEDURE — 74011250636 HC RX REV CODE- 250/636: Performed by: ORTHOPAEDIC SURGERY

## 2021-10-06 PROCEDURE — 74011250637 HC RX REV CODE- 250/637: Performed by: ANESTHESIOLOGY

## 2021-10-06 PROCEDURE — 2709999900 HC NON-CHARGEABLE SUPPLY: Performed by: ORTHOPAEDIC SURGERY

## 2021-10-06 PROCEDURE — 77030033138 HC SUT PGA STRATFX J&J -B: Performed by: ORTHOPAEDIC SURGERY

## 2021-10-06 PROCEDURE — 72170 X-RAY EXAM OF PELVIS: CPT

## 2021-10-06 PROCEDURE — 77030006835 HC BLD SAW SAG STRY -B: Performed by: ORTHOPAEDIC SURGERY

## 2021-10-06 PROCEDURE — 74011000250 HC RX REV CODE- 250: Performed by: NURSE PRACTITIONER

## 2021-10-06 DEVICE — TRIDENT II TRITANIUM CLUSTER 52E
Type: IMPLANTABLE DEVICE | Site: KNEE | Status: FUNCTIONAL
Brand: TRIDENT II

## 2021-10-06 DEVICE — HIP H2 TOT ADV OTHER HD -- IMPL CAPPED H2: Type: IMPLANTABLE DEVICE | Status: FUNCTIONAL

## 2021-10-06 DEVICE — CERAMIC V40 FEMORAL HEAD
Type: IMPLANTABLE DEVICE | Site: KNEE | Status: FUNCTIONAL
Brand: BIOLOX

## 2021-10-06 DEVICE — 132 DEGREE NECK ANGLE HIP STEM
Type: IMPLANTABLE DEVICE | Site: KNEE | Status: FUNCTIONAL
Brand: ACCOLADE

## 2021-10-06 DEVICE — TRIDENT X3 10 DEGREE POLYETHYLENE INSERT
Type: IMPLANTABLE DEVICE | Site: KNEE | Status: FUNCTIONAL
Brand: TRIDENT X3 INSERT

## 2021-10-06 RX ORDER — CYCLOBENZAPRINE HCL 10 MG
5 TABLET ORAL
Status: DISCONTINUED | OUTPATIENT
Start: 2021-10-06 | End: 2021-10-06 | Stop reason: HOSPADM

## 2021-10-06 RX ORDER — MIDAZOLAM HYDROCHLORIDE 1 MG/ML
INJECTION, SOLUTION INTRAMUSCULAR; INTRAVENOUS AS NEEDED
Status: DISCONTINUED | OUTPATIENT
Start: 2021-10-06 | End: 2021-10-06 | Stop reason: HOSPADM

## 2021-10-06 RX ORDER — AMOXICILLIN 250 MG
2 CAPSULE ORAL DAILY
Status: DISCONTINUED | OUTPATIENT
Start: 2021-10-07 | End: 2021-10-06 | Stop reason: HOSPADM

## 2021-10-06 RX ORDER — ONDANSETRON 4 MG/1
8 TABLET, ORALLY DISINTEGRATING ORAL
Status: DISCONTINUED | OUTPATIENT
Start: 2021-10-06 | End: 2021-10-06 | Stop reason: HOSPADM

## 2021-10-06 RX ORDER — ONDANSETRON 4 MG/1
4 TABLET, ORALLY DISINTEGRATING ORAL
Status: DISCONTINUED | OUTPATIENT
Start: 2021-10-06 | End: 2021-10-06 | Stop reason: HOSPADM

## 2021-10-06 RX ORDER — ACETAMINOPHEN 500 MG
1000 TABLET ORAL EVERY 6 HOURS
Status: DISCONTINUED | OUTPATIENT
Start: 2021-10-06 | End: 2021-10-06 | Stop reason: HOSPADM

## 2021-10-06 RX ORDER — CETIRIZINE HYDROCHLORIDE 5 MG/1
5 TABLET ORAL DAILY
Status: CANCELLED | OUTPATIENT
Start: 2021-10-06

## 2021-10-06 RX ORDER — TRANEXAMIC ACID 650 1/1
1300 TABLET ORAL
Status: DISCONTINUED | OUTPATIENT
Start: 2021-10-06 | End: 2021-10-06 | Stop reason: HOSPADM

## 2021-10-06 RX ORDER — ZOLPIDEM TARTRATE 5 MG/1
5 TABLET ORAL
Status: DISCONTINUED | OUTPATIENT
Start: 2021-10-06 | End: 2021-10-06 | Stop reason: HOSPADM

## 2021-10-06 RX ORDER — HYDROMORPHONE HYDROCHLORIDE 2 MG/ML
0.5 INJECTION, SOLUTION INTRAMUSCULAR; INTRAVENOUS; SUBCUTANEOUS
Status: DISCONTINUED | OUTPATIENT
Start: 2021-10-06 | End: 2021-10-06 | Stop reason: HOSPADM

## 2021-10-06 RX ORDER — CITALOPRAM 20 MG/1
20 TABLET, FILM COATED ORAL DAILY
Status: CANCELLED | OUTPATIENT
Start: 2021-10-06

## 2021-10-06 RX ORDER — LIDOCAINE HYDROCHLORIDE 10 MG/ML
0.1 INJECTION INFILTRATION; PERINEURAL AS NEEDED
Status: DISCONTINUED | OUTPATIENT
Start: 2021-10-06 | End: 2021-10-06 | Stop reason: HOSPADM

## 2021-10-06 RX ORDER — ASPIRIN 81 MG/1
81 TABLET ORAL EVERY 12 HOURS
Status: DISCONTINUED | OUTPATIENT
Start: 2021-10-06 | End: 2021-10-06 | Stop reason: HOSPADM

## 2021-10-06 RX ORDER — GABAPENTIN 100 MG/1
100 CAPSULE ORAL 2 TIMES DAILY
Status: DISCONTINUED | OUTPATIENT
Start: 2021-10-06 | End: 2021-10-06 | Stop reason: HOSPADM

## 2021-10-06 RX ORDER — FENTANYL CITRATE 50 UG/ML
100 INJECTION, SOLUTION INTRAMUSCULAR; INTRAVENOUS ONCE
Status: COMPLETED | OUTPATIENT
Start: 2021-10-06 | End: 2021-10-06

## 2021-10-06 RX ORDER — NALOXONE HYDROCHLORIDE 0.4 MG/ML
.2-.4 INJECTION, SOLUTION INTRAMUSCULAR; INTRAVENOUS; SUBCUTANEOUS
Status: DISCONTINUED | OUTPATIENT
Start: 2021-10-06 | End: 2021-10-06 | Stop reason: HOSPADM

## 2021-10-06 RX ORDER — HYDROMORPHONE HYDROCHLORIDE 2 MG/1
2 TABLET ORAL
Status: DISCONTINUED | OUTPATIENT
Start: 2021-10-06 | End: 2021-10-06 | Stop reason: HOSPADM

## 2021-10-06 RX ORDER — MIDAZOLAM HYDROCHLORIDE 1 MG/ML
2 INJECTION, SOLUTION INTRAMUSCULAR; INTRAVENOUS
Status: DISCONTINUED | OUTPATIENT
Start: 2021-10-06 | End: 2021-10-06 | Stop reason: HOSPADM

## 2021-10-06 RX ORDER — HYDROMORPHONE HYDROCHLORIDE 1 MG/ML
1 INJECTION, SOLUTION INTRAMUSCULAR; INTRAVENOUS; SUBCUTANEOUS
Status: DISCONTINUED | OUTPATIENT
Start: 2021-10-06 | End: 2021-10-06 | Stop reason: HOSPADM

## 2021-10-06 RX ORDER — ONDANSETRON 2 MG/ML
INJECTION INTRAMUSCULAR; INTRAVENOUS AS NEEDED
Status: DISCONTINUED | OUTPATIENT
Start: 2021-10-06 | End: 2021-10-06 | Stop reason: HOSPADM

## 2021-10-06 RX ORDER — SODIUM CHLORIDE 9 MG/ML
100 INJECTION, SOLUTION INTRAVENOUS CONTINUOUS
Status: DISCONTINUED | OUTPATIENT
Start: 2021-10-06 | End: 2021-10-06 | Stop reason: HOSPADM

## 2021-10-06 RX ORDER — SODIUM CHLORIDE 0.9 % (FLUSH) 0.9 %
5-40 SYRINGE (ML) INJECTION AS NEEDED
Status: DISCONTINUED | OUTPATIENT
Start: 2021-10-06 | End: 2021-10-06 | Stop reason: HOSPADM

## 2021-10-06 RX ORDER — TRANEXAMIC ACID 100 MG/ML
INJECTION, SOLUTION INTRAVENOUS AS NEEDED
Status: DISCONTINUED | OUTPATIENT
Start: 2021-10-06 | End: 2021-10-06 | Stop reason: HOSPADM

## 2021-10-06 RX ORDER — CEFAZOLIN SODIUM/WATER 2 G/20 ML
2 SYRINGE (ML) INTRAVENOUS ONCE
Status: DISCONTINUED | OUTPATIENT
Start: 2021-10-06 | End: 2021-10-06 | Stop reason: HOSPADM

## 2021-10-06 RX ORDER — DIPHENHYDRAMINE HCL 25 MG
25 CAPSULE ORAL
Status: DISCONTINUED | OUTPATIENT
Start: 2021-10-06 | End: 2021-10-06 | Stop reason: HOSPADM

## 2021-10-06 RX ORDER — PANTOPRAZOLE SODIUM 40 MG/1
40 TABLET, DELAYED RELEASE ORAL
Status: CANCELLED | OUTPATIENT
Start: 2021-10-06

## 2021-10-06 RX ORDER — SODIUM CHLORIDE, SODIUM LACTATE, POTASSIUM CHLORIDE, CALCIUM CHLORIDE 600; 310; 30; 20 MG/100ML; MG/100ML; MG/100ML; MG/100ML
100 INJECTION, SOLUTION INTRAVENOUS CONTINUOUS
Status: DISCONTINUED | OUTPATIENT
Start: 2021-10-06 | End: 2021-10-06 | Stop reason: HOSPADM

## 2021-10-06 RX ORDER — CELECOXIB 200 MG/1
200 CAPSULE ORAL ONCE
Status: COMPLETED | OUTPATIENT
Start: 2021-10-06 | End: 2021-10-06

## 2021-10-06 RX ORDER — PROPOFOL 10 MG/ML
INJECTION, EMULSION INTRAVENOUS
Status: DISCONTINUED | OUTPATIENT
Start: 2021-10-06 | End: 2021-10-06 | Stop reason: HOSPADM

## 2021-10-06 RX ORDER — OXYCODONE HYDROCHLORIDE 5 MG/1
5 TABLET ORAL
Status: COMPLETED | OUTPATIENT
Start: 2021-10-06 | End: 2021-10-06

## 2021-10-06 RX ORDER — MELOXICAM 7.5 MG/1
7.5 TABLET ORAL 2 TIMES DAILY
Status: DISCONTINUED | OUTPATIENT
Start: 2021-10-07 | End: 2021-10-06 | Stop reason: HOSPADM

## 2021-10-06 RX ORDER — CEFAZOLIN SODIUM/WATER 2 G/20 ML
2 SYRINGE (ML) INTRAVENOUS EVERY 8 HOURS
Status: DISCONTINUED | OUTPATIENT
Start: 2021-10-06 | End: 2021-10-06 | Stop reason: HOSPADM

## 2021-10-06 RX ORDER — ROPIVACAINE HYDROCHLORIDE 2 MG/ML
INJECTION, SOLUTION EPIDURAL; INFILTRATION; PERINEURAL AS NEEDED
Status: DISCONTINUED | OUTPATIENT
Start: 2021-10-06 | End: 2021-10-06 | Stop reason: HOSPADM

## 2021-10-06 RX ORDER — DEXAMETHASONE SODIUM PHOSPHATE 100 MG/10ML
10 INJECTION INTRAMUSCULAR; INTRAVENOUS ONCE
Status: DISCONTINUED | OUTPATIENT
Start: 2021-10-07 | End: 2021-10-06 | Stop reason: HOSPADM

## 2021-10-06 RX ORDER — MIDAZOLAM HYDROCHLORIDE 1 MG/ML
2 INJECTION, SOLUTION INTRAMUSCULAR; INTRAVENOUS ONCE
Status: DISCONTINUED | OUTPATIENT
Start: 2021-10-06 | End: 2021-10-06 | Stop reason: HOSPADM

## 2021-10-06 RX ORDER — SODIUM CHLORIDE 0.9 % (FLUSH) 0.9 %
5-40 SYRINGE (ML) INJECTION EVERY 8 HOURS
Status: DISCONTINUED | OUTPATIENT
Start: 2021-10-06 | End: 2021-10-06 | Stop reason: HOSPADM

## 2021-10-06 RX ORDER — KETOROLAC TROMETHAMINE 30 MG/ML
15 INJECTION, SOLUTION INTRAMUSCULAR; INTRAVENOUS EVERY 8 HOURS
Status: DISCONTINUED | OUTPATIENT
Start: 2021-10-06 | End: 2021-10-06 | Stop reason: HOSPADM

## 2021-10-06 RX ORDER — EPHEDRINE SULFATE/0.9% NACL/PF 50 MG/5 ML
SYRINGE (ML) INTRAVENOUS AS NEEDED
Status: DISCONTINUED | OUTPATIENT
Start: 2021-10-06 | End: 2021-10-06 | Stop reason: HOSPADM

## 2021-10-06 RX ORDER — DEXAMETHASONE SODIUM PHOSPHATE 100 MG/10ML
INJECTION INTRAMUSCULAR; INTRAVENOUS AS NEEDED
Status: DISCONTINUED | OUTPATIENT
Start: 2021-10-06 | End: 2021-10-06 | Stop reason: HOSPADM

## 2021-10-06 RX ORDER — KETOROLAC TROMETHAMINE 30 MG/ML
INJECTION, SOLUTION INTRAMUSCULAR; INTRAVENOUS AS NEEDED
Status: DISCONTINUED | OUTPATIENT
Start: 2021-10-06 | End: 2021-10-06 | Stop reason: HOSPADM

## 2021-10-06 RX ORDER — NALOXONE HYDROCHLORIDE 0.4 MG/ML
0.04 INJECTION, SOLUTION INTRAMUSCULAR; INTRAVENOUS; SUBCUTANEOUS
Status: DISCONTINUED | OUTPATIENT
Start: 2021-10-06 | End: 2021-10-06 | Stop reason: HOSPADM

## 2021-10-06 RX ORDER — ACETAMINOPHEN 500 MG
1000 TABLET ORAL ONCE
Status: DISCONTINUED | OUTPATIENT
Start: 2021-10-06 | End: 2021-10-06 | Stop reason: HOSPADM

## 2021-10-06 RX ADMIN — CELECOXIB 200 MG: 200 CAPSULE ORAL at 07:38

## 2021-10-06 RX ADMIN — MEPIVACAINE HYDROCHLORIDE 60 MG: 20 INJECTION, SOLUTION EPIDURAL; INFILTRATION at 08:15

## 2021-10-06 RX ADMIN — ONDANSETRON 4 MG: 4 TABLET, ORALLY DISINTEGRATING ORAL at 13:50

## 2021-10-06 RX ADMIN — TRANEXAMIC ACID 1 G: 100 INJECTION, SOLUTION INTRAVENOUS at 09:36

## 2021-10-06 RX ADMIN — FENTANYL CITRATE 25 MCG: 50 INJECTION INTRAMUSCULAR; INTRAVENOUS at 09:24

## 2021-10-06 RX ADMIN — HYDROMORPHONE HYDROCHLORIDE 2 MG: 2 TABLET ORAL at 14:21

## 2021-10-06 RX ADMIN — FENTANYL CITRATE 25 MCG: 50 INJECTION INTRAMUSCULAR; INTRAVENOUS at 09:40

## 2021-10-06 RX ADMIN — FENTANYL CITRATE 25 MCG: 50 INJECTION INTRAMUSCULAR; INTRAVENOUS at 09:32

## 2021-10-06 RX ADMIN — Medication 10 MG: at 08:53

## 2021-10-06 RX ADMIN — ONDANSETRON 4 MG: 2 INJECTION INTRAMUSCULAR; INTRAVENOUS at 08:34

## 2021-10-06 RX ADMIN — SODIUM CHLORIDE, SODIUM LACTATE, POTASSIUM CHLORIDE, AND CALCIUM CHLORIDE: 600; 310; 30; 20 INJECTION, SOLUTION INTRAVENOUS at 09:05

## 2021-10-06 RX ADMIN — MIDAZOLAM 2 MG: 1 INJECTION INTRAMUSCULAR; INTRAVENOUS at 07:55

## 2021-10-06 RX ADMIN — PHENYLEPHRINE HYDROCHLORIDE 50 MCG: 10 INJECTION INTRAVENOUS at 09:11

## 2021-10-06 RX ADMIN — KETOROLAC TROMETHAMINE 15 MG: 30 INJECTION, SOLUTION INTRAMUSCULAR at 13:50

## 2021-10-06 RX ADMIN — Medication 3 AMPULE: at 07:38

## 2021-10-06 RX ADMIN — Medication 10 MG: at 09:04

## 2021-10-06 RX ADMIN — CEFAZOLIN SODIUM 2 G: 100 INJECTION, POWDER, LYOPHILIZED, FOR SOLUTION INTRAVENOUS at 14:19

## 2021-10-06 RX ADMIN — PROPOFOL 100 MCG/KG/MIN: 10 INJECTION, EMULSION INTRAVENOUS at 08:20

## 2021-10-06 RX ADMIN — SODIUM CHLORIDE, SODIUM LACTATE, POTASSIUM CHLORIDE, AND CALCIUM CHLORIDE 100 ML/HR: 600; 310; 30; 20 INJECTION, SOLUTION INTRAVENOUS at 07:38

## 2021-10-06 RX ADMIN — TRANEXAMIC ACID 1300 MG: 650 TABLET ORAL at 13:50

## 2021-10-06 RX ADMIN — MIDAZOLAM 2 MG: 1 INJECTION INTRAMUSCULAR; INTRAVENOUS at 08:19

## 2021-10-06 RX ADMIN — TRANEXAMIC ACID 1 G: 100 INJECTION, SOLUTION INTRAVENOUS at 08:33

## 2021-10-06 RX ADMIN — DEXAMETHASONE SODIUM PHOSPHATE 10 MG: 10 INJECTION INTRAMUSCULAR; INTRAVENOUS at 08:34

## 2021-10-06 RX ADMIN — ACETAMINOPHEN 1000 MG: 500 TABLET, FILM COATED ORAL at 13:50

## 2021-10-06 RX ADMIN — OXYCODONE 5 MG: 5 TABLET ORAL at 10:33

## 2021-10-06 RX ADMIN — FENTANYL CITRATE 25 MCG: 50 INJECTION INTRAMUSCULAR; INTRAVENOUS at 09:53

## 2021-10-06 NOTE — PROGRESS NOTES
Problem: Mobility Impaired (Adult and Pediatric)  Goal: *Acute Goals and Plan of Care (Insert Text)  Outcome: Resolved/Met  Note: GOALS (1-4 days):  (1.)Ms. Luiz Mcdaniels will move from supine to sit and sit to supine  in bed with STAND BY ASSIST.    (2.)Ms. Luiz Mcdaniels will transfer from bed to chair and chair to bed with STAND BY ASSIST using the least restrictive device. (3.)Ms. Luiz Mcdaniels will ambulate with STAND BY ASSIST for 500 feet with the least restrictive device. (4.)Ms. Luiz Mcdaniels will ambulate up/down 12-14 steps with right railing with STAND BY ASSIST with device PRN.  (5.)Ms. Luiz Mcdaniels will state/observe ANATOLIY precautions with No verbal cues. ________________________________________________________________________________________________      PHYSICAL THERAPY JOINT CAMP ANATOLIY: Initial Assessment, Treatment Day: Day of Assessment, and PM 10/6/2021  OUTPATIENT SURGERY: Hospital Day: 1  Payor: Luis A Saba / Plan: Moira Adair HMO/CHOICE PLUS/POS / Product Type: HMO /      NAME/AGE/GENDER: Lamin Alcocer is a 61 y.o. female   PRIMARY DIAGNOSIS:  Primary osteoarthritis of right hip [M16.11]   Procedure(s) and Anesthesia Type:     * RIGHT HIP ARTHROPLASTY TOTAL/SATNAM/ OP - Spinal (Right)  ICD-10: Treatment Diagnosis:    · Pain in Right Hip (M25.551)  · Stiffness of Right Hip, Not elsewhere classified (M25.651)  · Difficulty in walking, Not elsewhere classified (R26.2)      ASSESSMENT:     Ms. Liuz Mcdaniels presents with impaired strength & mobility s/p right ANATOLIY. Pt also had decreased stability during out of bed activity. This pt's is functionally manageable for caregiver for DC home DOS, at a [facility] level of care. This pt is expected to progress quickly with HHPT & out pt follow up therapy. DC acute PT services. This section established at most recent assessment   PROBLEM LIST (Impairments causing functional limitations):  1. Decreased Strength  2. Decreased ADL/Functional Activities  3.  Decreased Transfer Abilities  4. Decreased Ambulation Ability/Technique  5. Decreased Balance  6. Increased Pain  7. Decreased Activity Tolerance  8. Decreased Knowledge of Precautions  9. Decreased [place] with Home Exercise Program   INTERVENTIONS PLANNED: (Benefits and precautions of physical therapy have been discussed with the patient.)  1. Bed Mobility  2. Cold  3. Gait Training  4. Home Exercise Program (HEP)  5. Range of Motion (ROM)  6. Therapeutic Activites  7. Therapeutic Exercise/Strengthening  8. Transfer Training     TREATMENT PLAN: Frequency/Duration: Follow patient BID for duration of hospital stay to address above goals. Rehabilitation Potential For Stated Goals: Good     RECOMMENDED REHABILITATION/EQUIPMENT: (at time of discharge pending progress): Continue Skilled Therapy and Home Health: Physical Therapy. HISTORY:   History of Present Injury/Illness (Reason for Referral):  Right ANATOLIY  Past Medical History/Comorbidities:   Ms. Yashira Arguelles  has a past medical history of Acne rosacea, Anxiety, Arthritis, Dysplasia of cervix, Dysplasia of vagina, Enlarged thyroid (8/7/2016), H/O bone density study (1/16/2013), H/O colonoscopy (2011), Insomnia, Marijuana user, and Seasonal allergic rhinitis. She also has no past medical history of Coagulation defects, COPD, Difficult intubation, Malignant hyperthermia due to anesthesia, Nausea & vomiting, Other ill-defined conditions(799.89), Pseudocholinesterase deficiency, or Unspecified adverse effect of anesthesia. Ms. Yashira Arguelles  has a past surgical history that includes hx appendectomy; pr breast surgery procedure unlisted; hx leep procedure (2011); hx colonoscopy (approximately 2011); and hx breast biopsy (Left, 2010).    Social History/Living Environment:   Home Environment: Private residence  # Steps to Enter: 5  Rails to Enter: Yes  Hand Rails : Right  One/Two Story Residence: Two story  # of Interior Steps: [address]: Right  [address] [place] [address] Available: No  Living Alone: No  Support Systems: Spouse/Significant Other  Patient Expects to be Discharged to[de-identified] House  Current DME Used/Available at Home: None  Tub or Shower Type: Shower    Prior Level of Function/Work/Activity:  Pt was independent without an assistive device   Number of Personal Factors/Comorbidities that affect the Plan of Care: 3+: HIGH COMPLEXITY   EXAMINATION:   Most Recent Physical Functioning:      Gross Assessment  AROM: Within functional limits (left LE)  Strength: Within functional limits (left LE)  Coordination: Within functional limits (left LE)                     Bed Mobility  Supine to Sit: Stand-by assistance  Sit to Supine: Stand-by assistance  Scooting: Stand-by assistance    Transfers  Sit to Stand: Stand-by assistance  Stand to Sit: Stand-by assistance  Bed to Chair: Stand-by assistance (with walker)    Balance  Sitting: Intact; Without support  Standing: Impaired; With support (walker)              Weight Bearing Status  Right Side Weight Bearing: As tolerated  Distance (ft):  (an additional 750ft after an initial 50ft gait assessment)  Ambulation - Level of Assistance: Stand-by assistance  Assistive Device: Walker, rolling  Speed/Dorita: Delayed  Step Length: Left shortened  Stance: Right decreased  Gait Abnormalities: Antalgic;Decreased step clearance  Number of Stairs Trained: 12  Stairs - Level of Assistance: Stand-by assistance  Rail Use: Right      Braces/Orthotics: none    Right Hip Cold  Type: Cold/ice packs      Body Structures Involved:  1. Joints  2. Muscles Body Functions Affected:  1. Sensory/Pain  2. Movement Related Activities and Participation Affected:  1. General Tasks and Demands  2.  Mobility   Number of elements that affect the Plan of Care: 4+: HIGH COMPLEXITY   CLINICAL PRESENTATION:   Presentation: Stable and uncomplicated: LOW COMPLEXITY   CLINICAL DECISION MAKIN [facility] [address] AM-PAC 6 Clicks   Basic Mobility Inpatient Short Form  How much difficulty does the patient currently have. .. Unable A Lot A Little None   1. Turning over in bed (including adjusting bedclothes, sheets and blankets)? [] 1   [] 2   [x] 3   [] 4   2. Sitting down on and standing up from a chair with arms ( e.g., wheelchair, bedside commode, etc.)   [] 1   [] 2   [x] 3   [] 4   3. Moving from lying on back to sitting on the side of the bed? [] 1   [] 2   [x] 3   [] 4   How much help from another person does the patient currently need. .. Total A Lot A Little None   4. Moving to and from a bed to a chair (including a wheelchair)? [] 1   [] 2   [x] 3   [] 4   5. Need to walk in hospital room? [] 1   [] 2   [x] 3   [] 4   6. Climbing 3-5 steps with a railing? [] 1   [] 2   [x] 3   [] 4   © 2007, Trustees of [address] [place] [address], under license to [employer]. All rights reserved     Score:  Initial: 18 Most Recent: X (Date: -- )    Interpretation of Tool:  Represents activities that are increasingly more difficult (i.e. Bed mobility, Transfers, Gait). Medical Necessity:     · Patient is expected to demonstrate progress in   · strength, balance, coordination, and functional technique  ·  to   · decrease assistance required with bed mobility, transfers & gait  · .  Reason for Services/Other Comments:  · Patient continues to require skilled intervention due to   · Pt not independent with functional mobility  · . Use of outcome tool(s) and clinical judgement create a POC that gives a: Clear prediction of patient's progress: LOW COMPLEXITY            TREATMENT:   (In addition to Assessment/Re-Assessment sessions the following treatments were rendered)     Pre-treatment Symptoms/Complaints:  nausea  Pain Initial: numeric scale  Pain Intensity 1: 3  Pain Location 1: Hip  Pain Orientation 1: Right  Pain Intervention(s) 1: Cold pack  Post Session:  3/10     Therapeutic Activity: (    38 min (extra time to work through activity noted):   Therapeutic activities including ANATOLIY exercises, repeated bed mobility for practice, pre-gait wt-shift & stepping, progressive gait training an additional 750 ft after an initial 50 ft gait assessment & stair training to improve mobility, strength, balance, coordination, and dynamic movement of leg - right to improve functional endurance & stability . Assessment     Date:  10/6 Date:   Date:     ACTIVITY/EXERCISE AM PM AM PM AM PM   GROUP THERAPY  []  []  []  []  []  []   Ankle Pumps  20       Quad Sets  20       Gluteal Sets  20       Hip ABd/ADduction  20       Straight Leg Raises  --       Knee Slides  20       Short Arc Quads  20       Long Arc Quads  20       Chair Slides  --                B = bilateral; AA = active assistive; A = active; P = passive      Treatment/Session Assessment:     Response to Treatment:  tolerated well. Education:  [x] Home Exercises  [x] Fall Precautions  [x] Hip Precautions [x] D/C Instruction Review  [] Hip Prosthesis Review  [x] Walker Management/Safety [] Adaptive Equipment as Needed       Interdisciplinary Collaboration:   o Occupational Therapist  o Registered Nurse    After treatment position/precautions:   o Up in chair  o Bed/Chair-wheels locked  o Caregiver at bedside  o Call light within reach  o RN notified  o Family at bedside    Compliance with Program/Exercises: Will assess as treatment progresses. Recommendations/Intent for next treatment session:  Treatment next visit will focus on increasing Ms. Branch's independence with bed mobility, transfers, gait training, strength/ROM exercises, modalities for pain, and patient education.       Total Treatment Duration:  PT Patient Time In/Time Out  Time In: 1345  Time Out: 1430    Gilford Goods, PT

## 2021-10-06 NOTE — PROGRESS NOTES
Joint Camp Case Management note:  Patient screened for discharge planning needs. Patient s/p total joint replacement going home the same day as surgery. We discussed Home Health and equipment needed after surgery. List of Home Health providers offered. Patient w/o preference towards provider. Initial referral sent to [facility]. Pt requesting rolling walker and 3-1 bSC for home use.  Equip delivered prior to d/c

## 2021-10-06 NOTE — ANESTHESIA PROCEDURE NOTES
Spinal Block    Performed by: Elver Patton MD  Authorized by: Elver Patton MD     Pre-procedure: Indications: primary anesthetic  Preanesthetic Checklist: patient identified, risks and benefits discussed, anesthesia consent, patient being monitored and timeout performed    Timeout Time: 08:12 EDT  Preanesthetic Checklist comment:  Risk of nerve damage discussed.     Spinal Block:   Patient Position:  Seated  Prep Region:  Lumbar  Prep: chlorhexidine and patient draped      Location:  L3-4  Technique:  Single shot    Local Dose (mL):  3    Needle:   Needle Type:  Pencan  Needle Gauge:  25 G  Attempts:  1      Events: CSF confirmed, no blood with aspiration and no paresthesia        Assessment:  Insertion:  Uncomplicated  Patient tolerance:  Patient tolerated the procedure well with no immediate complications

## 2021-10-06 NOTE — OP NOTES
Total Hip Procedure Note    Eli Rudolph,  [id number],  1955    Pre-operative Diagnosis: Primary osteoarthritis of right hip [M16.11]    Post-operative Diagnosis: Same    Procedure: Right Total Hip Arthroplasty    BMI: There is no height or weight on file to calculate BMI. .    Location: [address] [place] [address]    Surgeon: Ella Hobson MD    Assistant: Dorota Stokes CFA and Toya Cook NP CFA    Anesthesia: Spinal     Complications: None    EBL: 200cc    Drains: None    Intra-op Findings: Pre-operatively leg lengths were assessed using preop Xrays and with the patient in the lateral decubitus position and operative leg was felt to be similar in length compared to the contralateral leg. The operative hip showed notable cartilage loss of both the femoral head and acetabulum. No intra-operative periprosthetic fracture was encountered. Sciatic nerve was noted to be intact at the end of the procedure. We measured the distance of our resected femoral head/neck from the cut surface to the center of the femoral head to be approximately 30mm. The overall length replaced with the implanted head/neck was 30mm. Intra-operatively we felt that we restored the patients leg lengths to equal lengths using the method described later. Postop with the patient supine we assessed leg lengths and felt they were similar. Patient condition at completion of Procedure: Stable    Implants:   Implant Name Type Inv.  Item Serial No.  Lot No. LRB No. Used Action   PIN FIX TROCAR PT 1 END 9/64X9 IN 1 PT STYL SMOOTH PLN STRL - [id number]  PIN FIX TROCAR PT 1 END 9/64X9 IN 1 PT STYL SMOOTH PLN STRL  [facility] [id number] Right 2 Implanted and Explanted   SHELL ACET CLUS H 52E TRTANIUM -- TRIDENT II - [id number]  SHELL ACET CLUS H 52E TRTANIUM -- TRIDENT II  [employer] [facility]_ [id number] Right 1 Implanted   INSERT ACET E 10 DEG 36 MM HIP X3 TRIDENT - [id number]  INSERT ACET E 10 DEG 36 MM HIP X3 TRIDENT 1001 [employer] [facility] [id number] Right 1 Implanted   STEM FEM SZ 4 L105MM NK L35MM 38MM OFFSET 132DEG HIP TI - [id number]  STEM FEM SZ 4 L105MM NK L35MM 38MM OFFSET 132DEG HIP TI  [employer] [facility]_ [id number] Right 1 Implanted   HEAD FEM DELT V40 -5MM NK 36MM -- V40 BIOLOX - [id number]  HEAD FEM DELT V40 -5MM NK 36MM -- V40 BIOLOX  [employer] [facility]_ [id number] Right 1 Implanted       Description of Procedure    The complexity of the total joint surgery requires the use of a first assistant for positioning, retraction and assistance in closure. Pao Vargas was brought to the operating room. Patient was transferred from the stretcher to OR bed. Anesthesia was induced. IV antibiotics were administered per protocol. Pao Vargas was positioned in the lateral decubitus position and the pelvis/torso stabilized with posts. The right leg was prepped and draped in the usual sterile fashion. A time out identifying the patient, procedure, operative side and surgeon was administered and charted by the OR Nurse. Prior to incision one gram of TXA was given intravenously. A standard posterior approach was utilized to expose the right hip. The incision was carried through the subcutaneous tissue and underlying fascia with hemostasis obtained using the bovie cauterization and Aquamantys. A Charmley retractor was inserted. We resected any redundent bursal tissue off the external rotators. We were able to identify the piriformis tendon. The short external rotators and capsule were dissected off the posterior femur as a single layer just superior to the piriformis tendon. The sciatic nerve was palpated and protected during dissection. The femoral head was dislocated. The articular surface revealed loss of cartilage, exposed bone and bone spurring. The neck was osteotomized approximately 1cm above the top of the lesser trochanter.  We were careful to protect the greater trochanter during osteotomy and protect it from iatrogenic injury. Acetabular retractors were placed both anterior and posterior just superficial to the acetabular labrum. Remaining acetabular labrum was resected and any soft tissue was removed from the acetabulum including any tissue within the codyloid fossa. The acetabular surface revealed loss of cartilage with exposed bone. The acetabulum was sequentially reamed noting proper anteversion and inclination during reaming. The transverse acetabular ligament was used as the primary anatomic landmark to determine version and inclination. The acetabulum was sized to a 52 mm acetabular component. The prepared acetabulum was irrigated of any residual reamings and soft tissue. The permanent acetabular component was impacted into place to achieve appropriate horizontal tilt, anteversion, bone coverage and stability. We visualize that the acetabular component was fully seated. A trial liner was impacted into position. We did not have to excise overhanging osteophytes anterior and posterior  in order to minimize the risk of impingement. We then turned our attention to the proximal femur. A 2-prong proximal femoral retractor was placed. We gained access to the proximal femur using a  and femoral canal finder. The canal was prepared with appropriate lateralization in which we used the initial smaller broaches to remove lateral bone to avoid varus placement of the femoral component. The canal was then broached progressively. The broach was positioned with the appropriate anatomic femoral anteversion. We broached up to a size 4 Accolade II stem. A calcar planar was used. A trial reduction with a size #4 132 degree Accolade II stem with a -5 neck length and 36 mm head was performed. This was found to be the most stable with maximum hip flexion and with internal/external rotation testing. We did not appreciate any evidence of component or bony impingement.   Limb lengths were assessed using three techniques. First, the position of the tip of the operative greater trochanter was compared to the center of the femoral head component and was felt to match this same anatomic relationship as the non-operative hip based on preoperative X-rays. Next, we measured the length of our resected femoral head neck and felt that the trial components matched this resected length as closely as possible when accounting for the length provided by the femoral neck/head. Finally, we compared leg lengths by comparing the possible of the patella with the patients heels together with the patient in the lateral decubitus position. Using these methods it was felt that the patients leg lengths were equilibrated and with appropriate stability as mentioned above. All trial components were removed. The final liner was impacted into place which was a 10 degree elevated liner. A cementless size 4 132 degree Accolade II stem was impacted into place carefully. We were careful to observe the calcar region for any iatrogenic fractures during insertion. The permanent femoral head was impacted into place which was a -5mm 36mm ceramic head. Glendy Branch's hip was reduced and stability was as mentioned above. Dilute Betadine solution was allowed to sit in the surgical site for a 3 minute period and copious saline was used to irrigate the surgical site. The sciatic nerve was palpated and noted to be intact. A periarticular of ropivicaine, toradol, and morphine was injected about the surgical site with care being taken not to inject in the vicinity of neurovascular structures. Prior to wound closure one additional gram of TXA was given for a total of 2 grams. The capsule was repaired with a three #2 Fiberwires secured through drill holes placed in the posterior aspect of the trochanter. No drain was placed. The fascia was closed with a #0 Bidirectional Stratafix barbed suture.  The sub-Q layer was closed with 2-0 monocril suture and a  3-0 moncril stratafix suture in a running subcuticular fashion was used to close the skin. The incision site was thoroughly cleaned with alcohol and the Exofin wound closure system was applied to seal it off from external contamination after the overlying skin was thoroughly cleaned with alcohol. A thin layer of [place] lubricant was applied over the wound to keep the dressing from adhering to the overlying sterile bandage and said bandage was placed. Drapes were then broken down and patient was transferred carefully back to the OR stretcher. The sponge and needle counts were correct. The patient tolerated the procedure without difficulty and left the operating room in satisfactory condition.     Signed By: Arlyn Carpenter MD

## 2021-10-06 NOTE — PERIOP NOTES
TRANSFER - OUT REPORT:    Verbal report given to receiving nurse Katrin(name) on Aide Miller being transferred to obs unit(unit) for routine progression of care       Report consisted of patient's Situation, Background, Assessment and   Recommendations(SBAR). Information from the following report(s) OR Summary, Procedure Summary, Intake/Output and MAR was reviewed with the receiving nurse. Opportunity for questions and clarification was provided.       Patient transported with:   [facility]

## 2021-10-06 NOTE — DISCHARGE INSTRUCTIONS
[address] [facility]   Patient Discharge Instructions    Zia Doss / [id number] : 1955    Admitted 10/6/2021 Discharged: 10/6/2021     IF YOU HAVE ANY PROBLEMS ONCE YOU ARE AT HOME CALL THE FOLLOWING NUMBERS:   Main office number: [phone]    Take Home Medications     · It is important that you take the medication exactly as they are prescribed. · Keep your medication in the bottles provided by the pharmacist and keep a list of the medication names, dosages, and times to be taken in your wallet. · Do not take other medications without consulting your doctor. What to do at [address] your prehospital diet. If you have excessive nausea or vomitting call your doctor's office     Home Physical Therapy is arranged. Use rolling walker when walking. Patients who have had a joint replacement should not drive until you are seen for your follow up appointment by Dr. Mary Buckner. When to Call    - Call if you have a temperature greater then 101  - Unable to keep food down  - Loose control of your bladder or bowel function  - Are unable to bear any weight   - Need a pain medication refill     Patient Education        Hip Replacement Surgery (Posterior): What to Expect at Home  Your Recovery  Hip replacement surgery replaces the worn parts of your hip joint. When you leave the hospital, you will probably be walking with crutches or a walker. You may be able to climb a few stairs and get in and out of bed and chairs. But you will need someone to help you at home until you have more energy and can move around better. You will go home with a bandage and stitches, staples, skin glue, or tape strips. You can remove the bandage when your doctor tells you to. If you have stitches or staples, your doctor will remove them about 2 weeks after your surgery. Glue or tape strips will fall off on their own over time.  You may still have some mild pain, and the area may be swollen for 3 to 4 months after surgery. Your doctor may give you medicine for the pain. You will continue the rehabilitation program (rehab) you started in the hospital. The better you do with your rehab exercises, the sooner you will get your strength and movement back. Most people are able to return to work 4 weeks to 4 months after surgery. This care sheet gives you a general idea about how long it will take for you to recover. But each person recovers at a different pace. Follow the steps below to get better as quickly as possible. How can you care for yourself at home? Activity    · Your doctor may not want your affected leg to cross the center of your body toward the other leg. If so, your therapist may suggest these ideas:  ? Do not cross your legs. ? Be very careful as you get in or out of bed or a car so your leg does not cross the imaginary line in the middle of your body.     · Go slowly when you climb stairs. Make sure the lights are on. Have someone watch you, if you can. When you climb stairs:  ? Step up first with your unaffected leg. Then bring the affected leg up to the same step. Bring your crutches or cane up. ? To go down stairs, reverse the order. First, put your crutches or cane on the lower step. Then bring the affected leg down to that step. Finally, step down with the unaffected leg.     · You can ride in a car, but stop at least once every hour to get out and walk around.     · You may want to sleep on your back. Don't reach down too far to pull up blankets when you lie in bed.     · If your doctor recommends exercises, do them as directed. You can cut back on your exercises if your muscles start to ache, but don't stop doing them.     · Rest when you feel tired. You may take a nap, but don't stay in bed all day.     · Work with your physical therapist to learn the best way to exercise.  You will probably have to use a walker, crutches, or a cane for at least 4 to 6 weeks.     · Your doctor may advise you to stay away from activities that put stress on the joint. This includes sports such as tennis, football, and jogging.     · Try not to sit for too long at one time. You will feel less stiff if you take a short walk about every hour. When you sit, use chairs with arms, and don't sit in low chairs.     · Do not bend over more than 90 degrees (like the angle in a letter \"L\").     · Sleep on your back with your legs slightly apart or on your side with a pillow between your knees for about 6 weeks or as your doctor tells you. Do not sleep on your stomach or affected leg.     · Ask your doctor when you can drive again.     · Most people are able to return to work 4 weeks to 4 months after surgery.     · Ask your doctor when it is okay for you to have sex. Diet    · By the time you leave the hospital, you will probably be eating your normal diet. Your doctor may recommend that you take iron and vitamin supplements.     · Drink plenty of fluids (unless your doctor tells you not to).   · Eat healthy foods, and watch your portion sizes. Try to stay at your ideal weight. Too much weight puts more stress on your new hip joint.     · You may notice that your bowel movements are not regular right after your surgery. This is common. Try to avoid constipation and straining with bowel movements. You may want to take a fiber supplement every day. If you have not had a bowel movement after a couple of days, ask your doctor about taking a mild laxative. Medicines    · Your doctor will tell you if and when you can restart your medicines. You will also get instructions about taking any new medicines.     · If you take aspirin or some other blood thinner, ask your doctor if and when to start taking it again. Make sure that you understand exactly what your doctor wants you to do.     · Your doctor may give you a blood-thinning medicine to prevent blood clots.  If you take a blood thinner, be sure you get instructions about how to take your medicine safely. Blood thinners can cause serious bleeding problems. This medicine could be in pill form or as a shot (injection). If a shot is necessary, your doctor will tell you how to do this.     · Be safe with medicines. Take pain medicines exactly as directed. ? If the doctor gave you a prescription medicine for pain, take it as prescribed. ? If you are not taking a prescription pain medicine, ask your doctor if you can take an over-the-counter medicine.     · If you think your pain medicine is making you sick to your stomach:  ? Take your medicine after meals (unless your doctor has told you not to). ? Ask your doctor for a different pain medicine.     · If your doctor prescribed antibiotics, take them as directed. Do not stop taking them just because you feel better. You need to take the full course of antibiotics. Incision care    · If your doctor told you how to care for your cut (incision), follow your doctor's instructions. You will have a dressing over the cut. A dressing helps the incision heal and protects it. Your doctor will tell you how to take care of this.     · If you did not get instructions, follow this general advice:  ? If you have strips of tape on the cut the doctor made, leave the tape on for a week or until it falls off.  ? If you have stitches or staples, your doctor will tell you when to come back to have them removed. ? If you have skin glue on the cut, leave it on until it falls off. Skin glue is also called skin adhesive or liquid stitches. ? Change the bandage every day. ? Wash the area daily with warm water, and pat it dry. Don't use hydrogen peroxide or alcohol. They can slow healing. ? You may cover the area with a gauze bandage if it oozes fluid or rubs against clothing. ? You may shower 24 to 48 hours after surgery. Pat the incision dry. Don't swim or take a bath for the first 2 weeks, or until your doctor tells you it is okay.    Exercise    · Your physical therapist will teach you exercises to do at home. Always do them as your therapist tells you.     · Avoid activities where you might fall. Ice and elevation    · For pain, put ice or a cold pack on the area for 10 to 20 minutes at a time. Put a thin cloth between the ice and your skin.     · Your ankle may swell for about 3 months. Prop up your ankle when you ice it or anytime you sit or lie down. Try to keep it above the level of your heart. This will help reduce swelling. Other instructions    · Wear compression stockings if your doctor told you to. These may help to prevent blood clots. Your doctor will tell you how long you need to keep wearing the compression stockings.     · Try to prevent falls. To avoid falling:  ? Arrange furniture so that you will not trip on it. ? Get rid of throw rugs, and move electrical cords out of the way. ? Walk only in areas with plenty of light. ? Put grab bars in showers and bathtubs. ? Try to avoid icy or snowy sidewalks. Choose shoes with good traction, or consider using traction devices that attach to your shoes. ? Wear shoes with sturdy, flat soles. Follow-up care is a key part of your treatment and safety. Be sure to make and go to all appointments, and call your doctor if you are having problems. It's also a good idea to know your test results and keep a list of the medicines you take. When should you call for help? Call 911 anytime you think you may need emergency care. For example, call if:    · You passed out (lost consciousness).     · You have severe trouble breathing.     · You have sudden chest pain and shortness of breath, or you cough up blood. Call your doctor now or seek immediate medical care if:    · You have signs that your hip may be dislocated, including:  ? Severe pain and not being able to stand. ? A crooked leg that looks like your hip is out of position. ?  Not being able to bend or straighten your leg.     · Your leg or foot is cool or pale or changes color.     · You cannot feel or move your leg.     · You have signs of a blood clot, such as:  ? Pain in your calf, back of the knee, thigh, or groin. ? Redness and swelling in your leg or groin.     · Your incision comes open and begins to bleed, or the bleeding increases.     · You feel like your heart is racing or beating irregularly.     · You have signs of infection, such as:  ? Increased pain, swelling, warmth, or redness. ? Red streaks leading from the incision. ? Pus draining from the incision. ? A fever. Watch closely for changes in your health, and be sure to contact your doctor if:    · You do not have a bowel movement after taking a laxative.     · You do not get better as expected. Where can you learn more? Go to http://www.gray.com/  Enter Q746 in the search box to learn more about \"Hip Replacement Surgery (Posterior): What to Expect at Home. \"  Current as of: July 1, 2021               Content Version: 13.0  © 2006-2021 [employer]. Care instructions adapted under license by [employer] (which disclaims liability or warranty for this information). If you have questions about a medical condition or this instruction, always ask your healthcare professional. [place] [address] any warranty or liability for your use of this information. Information obtained by :  I understand that if any problems occur once I am at home I am to contact my physician. I understand and acknowledge receipt of the instructions indicated above.                                                                                                                                            Physician's or R.N.'s Signature                                                                  Date/Time Patient or Representative Signature                                                          Date/Time

## 2021-10-06 NOTE — PROGRESS NOTES
Pt arrived to observation unit. Pt denies pain at this time. SCD's and Ice in place. Dressing to right hip c/d/i. Family at bedside. Call light within reach.

## 2021-10-06 NOTE — ANESTHESIA PREPROCEDURE EVALUATION
Relevant Problems   No relevant active problems       Anesthetic History   No history of anesthetic complications            Review of Systems / Medical History  Pertinent labs reviewed    Pulmonary  Within defined limits                 Neuro/Psych         Psychiatric history     Cardiovascular  Within defined limits                Exercise tolerance: >4 METS     GI/Hepatic/Renal  Within defined limits              Endo/Other      Hypothyroidism  Arthritis     Other Findings              Physical Exam    Airway  Mallampati: I  TM Distance: 4 - 6 cm  Neck ROM: normal range of motion   Mouth opening: Normal     Cardiovascular  Regular rate and rhythm,  S1 and S2 normal,  no murmur, click, rub, or gallop             Dental  No notable dental hx       Pulmonary  Breath sounds clear to auscultation               Abdominal  GI exam deferred       Other Findings            Anesthetic Plan    ASA: 2  Anesthesia type: spinal          Induction: Intravenous  Anesthetic plan and risks discussed with: Patient and Spouse

## 2021-10-06 NOTE — PROGRESS NOTES
Problem: Self Care Deficits Care Plan (Adult)  Goal: *Acute Goals and Plan of Care (Insert Text)  Outcome: Progressing Towards Goal  Note: GOALS:   DISCHARGE GOALS (in preparation for going home/rehab):  3 days  1. Ms. Anil Rockwell will perform one lower body dressing activity with minimal assistance with adaptive equipment to demonstrate improved functional mobility and safety. -GOAL MET 10/6/2021   2. Ms. Anil Rockwell will perform one lower body bathing activity with minimal  assistance with adaptive equipment to demonstrate improved functional mobility and safety. -GOAL MET 10/6/2021   3. Ms. Anil Rockwell will perform toileting/toilet transfer with contact guard assistance with adaptive equipment to demonstrate improved functional mobility and safety. -GOAL MET 10/6/2021   4. Ms. Anil Rockwell will perform shower transfer with contact guard assistance with adaptive equipment to demonstrate improved functional mobility and safety. -GOAL MET 10/6/2021   5. Ms. Anil Rockwell will state ANATOLIY precautions with two verbal cues to demonstrate improved functional mobility and safety. -GOAL MET 10/6/2021          JOINT CAMP OCCUPATIONAL THERAPY ANATOLIY: Initial Assessment, Daily Note, and Discharge 10/6/2021  OUTPATIENT SURGERY: Hospital Day: 1  Payor: Wale Castaneda / Plan: Carla Marquez HMO/CHOICE PLUS/POS / Product Type: HMO /      NAME/AGE/GENDER: Austin Guardado is a 61 y.o. female   PRIMARY DIAGNOSIS:  Primary osteoarthritis of right hip [M16.11]   Procedure(s) and Anesthesia Type:     * RIGHT HIP ARTHROPLASTY TOTAL/SATNAM/ OP - Spinal (Right)  ICD-10: Treatment Diagnosis:    Pain in Right Hip (M25.551)  Stiffness of Right Hip, Not elsewhere classified (M25.651)      ASSESSMENT:     Ms. Anil Rockwell is s/p Right ANATOLIY and presents with decreased weight bearing on R LE and decreased independence with functional mobility and activities of daily living as compared to prior level of function and safety.     Patient completed shower and dressing as charted below in ADL grid and is ambulating with rolling walker and supervision assist.  Patient has met 5/5 goals and plans to return home with good family support. Family able to provide patient with appropriate level of assistance at this time. Will do well at home for self cares and transfers during ADL's.  D/C OT for acute deficits. This section established at most recent assessment   PROBLEM LIST (Impairments causing functional limitations):  Decreased Strength  Decreased ADL/Functional Activities  Decreased Transfer Abilities  Increased Pain  Increased Fatigue  Decreased Flexibility/Joint Mobility  Decreased Knowledge of Precautions   INTERVENTIONS PLANNED: (Benefits and precautions of occupational therapy have been discussed with the patient.)  Activities of daily living training  Adaptive equipment training  Balance training  Clothing management  Donning&doffing training  Theraputic activity     TREATMENT PLAN: Frequency/Duration: Follow patient 1-2tx to address above goals. Rehabilitation Potential For Stated Goals: Good     RECOMMENDED REHABILITATION/EQUIPMENT: (at time of discharge pending progress): Continue Skilled Therapy. OCCUPATIONAL PROFILE AND HISTORY:   History of Present Injury/Illness (Reason for Referral): Pt presents this date s/p (Right) ANATOLIY. Past Medical History/Comorbidities:   Ms. Saad Thomas  has a past medical history of Acne rosacea, Anxiety, Arthritis, Dysplasia of cervix, Dysplasia of vagina, Enlarged thyroid (8/7/2016), H/O bone density study (1/16/2013), H/O colonoscopy (2011), Insomnia, Marijuana user, and Seasonal allergic rhinitis. She also has no past medical history of Coagulation defects, COPD, Difficult intubation, Malignant hyperthermia due to anesthesia, Nausea & vomiting, Other ill-defined conditions(799.89), Pseudocholinesterase deficiency, or Unspecified adverse effect of anesthesia.   Ms. Saad Thomas  has a past surgical history that includes hx appendectomy; pr breast surgery procedure unlisted; hx leep procedure (2011); hx colonoscopy (approximately 2011); and hx breast biopsy (Left, 2010). Social History/Living Environment:   Home Environment: Private residence  # Steps to Enter: 5  Rails to Enter: Yes  Hand Rails : Right  One/Two Story Residence: Two story  # of Interior Steps: 12  Interior Rails: Left  Lift Chair Available: No  Living Alone: No  Support Systems: Spouse/Significant Other  Patient Expects to be Discharged to[de-identified] [employer]  Current DME Used/Available at Home: None  Tub or Shower Type: Shower    Prior Level of Function/Work/Activity:  Independent prior. Number of Personal Factors/Comorbidities that affect the Plan of Care: Brief history (0):  LOW COMPLEXITY   ASSESSMENT OF OCCUPATIONAL PERFORMANCE[de-identified]   Most Recent Physical Functioning:   Balance  Sitting: Intact  Standing: With support                    Coordination  Fine Motor Skills-Upper: Left Intact; Right Intact  Gross Motor Skills-Upper: Left Intact; Right Intact         Mental Status  Neurologic State: Alert  Orientation Level: Oriented X4  Cognition: Appropriate decision making  Perception: Appears intact                Basic ADLs (From Assessment) Complex ADLs (From Assessment)   Basic ADL  Feeding: Independent  Oral Facial Hygiene/Grooming: Setup  Bathing: Stand-by assistance  Upper Body Dressing: Supervision  Lower Body Dressing: Contact guard assistance  Toileting: Stand by assistance     Grooming/Bathing/Dressing Activities of Daily Living                       Functional Transfers  Bathroom Mobility: Stand-by assistance  Toilet Transfer : Stand-by assistance  Shower Transfer: Stand-by assistance     Bed/Mat Mobility  Supine to Sit: Supervision  Sit to Stand: Supervision  Stand to Sit: Supervision  Bed to Chair: Stand-by assistance  Scooting: Supervision         Physical Skills Involved:  Range of Motion  Balance  Strength  Activity Tolerance Cognitive Skills Affected (resulting in the inability to perform in a timely and safe manner):  [facility] Psychosocial Skills Affected:  WFL   Number of elements that affect the Plan of Care: 1-3:  LOW COMPLEXITY   CLINICAL DECISION MAKING:   [employer] AM-PAC 6 Clicks   Daily Activity Inpatient Short Form  How much help from another person does the patient currently need. .. Total A Lot A Little None   1. Putting on and taking off regular lower body clothing? [] 1   [] 2   [x] 3   [] 4   2. Bathing (including washing, rinsing, drying)? [] 1   [] 2   [x] 3   [] 4   3. Toileting, which includes using toilet, bedpan or urinal?   [] 1   [] 2   [x] 3   [] 4   4. Putting on and taking off regular upper body clothing? [] 1   [] 2   [] 3   [x] 4   5. Taking care of personal grooming such as brushing teeth? [] 1   [] 2   [] 3   [x] 4   6. Eating meals? [] 1   [] 2   [] 3   [x] 4   © 2007, Trustees of [facility] [employer], under license to [employer]. All rights reserved     Score:  Initial: 21 Most Recent: X (Date: -- )    Interpretation of Tool:  Represents activities that are increasingly more difficult (i.e. Bed mobility, Transfers, Gait). Medical Necessity:     Skilled intervention continues to be required due to Deficits noted above. Reason for Services/Other Comments:  Patient continues to require skilled intervention due to   New ANATOLIY  . Use of outcome tool(s) and clinical judgement create a POC that gives a: MODERATE COMPLEXITY            TREATMENT:   (In addition to Assessment/Re-Assessment sessions the following treatments were rendered)     Pre-treatment Symptoms/Complaints:    Pain: Initial:   Pain Intensity 1: 3  Post Session:  3     Self Care: (40): Procedure(s) (per grid) utilized to improve and/or restore self-care/home management as related to dressing, toileting, and grooming. Required minimal verbal and tactile cueing to facilitate activities of daily living skills.     Initial evaluation 5 mintues. Treatment/Session Assessment:     Response to Treatment:  Good, sitting edge of bed. .    Education:  [] Home Exercises  [x] Fall Precautions  [x] Hip Precautions [] Going Home Video  [] Knee/Hip Prosthesis Review  [x] Walker Management/Safety [x] Adaptive Equipment as Needed       Interdisciplinary Collaboration:   Physical Therapist  Occupational Therapist  Registered Nurse    After treatment position/precautions:   Up in chair  Bed/Chair-wheels locked  Caregiver at bedside  Call light within reach  RN notified     Compliance with Program/Exercises: Compliant all of the time, Will assess as treatment progresses. Recommendations/Intent for next treatment session:  Treatment next visit will focus on increasing Ms. Branch's independence with bed mobility, transfers, self care, functional mobility, modalities for pain, and patient education.       Total Treatment Duration:  OT Patient Time In/Time Out  Time In: 1305  Time Out: [address], [place]

## 2021-10-06 NOTE — ANESTHESIA POSTPROCEDURE EVALUATION
Procedure(s):  RIGHT HIP ARTHROPLASTY TOTAL/SATNAM/ OP.    spinal    Anesthesia Post Evaluation        Patient location during evaluation: PACU  Patient participation: complete - patient participated  Level of consciousness: awake  Pain management: satisfactory to patient  Airway patency: patent  Anesthetic complications: no  Cardiovascular status: hemodynamically stable  Respiratory status: spontaneous ventilation  Hydration status: euvolemic  Post anesthesia nausea and vomiting:  none    Based on chart review. Not called for sign out.     INITIAL Post-op Vital signs:   Vitals Value Taken Time   /65 10/06/21 1030   Temp 37.2 °C (98.9 °F) 10/06/21 1000   Pulse 67 10/06/21 1030   Resp 16 10/06/21 1030   SpO2 98 % 10/06/21 1030

## 2021-10-06 NOTE — PROGRESS NOTES
Discharge instructions provided. Pt and  verbalized understanding. All questions answered. Medications reviewed. IV removed and cath tip intact.

## 2021-10-07 ENCOUNTER — HOME CARE VISIT (OUTPATIENT)
Dept: SCHEDULING | Facility: HOME HEALTH | Age: 66
End: 2021-10-07
Payer: COMMERCIAL

## 2021-10-07 VITALS
RESPIRATION RATE: 18 BRPM | HEART RATE: 70 BPM | DIASTOLIC BLOOD PRESSURE: 64 MMHG | OXYGEN SATURATION: 97 % | TEMPERATURE: 98.4 F | SYSTOLIC BLOOD PRESSURE: 108 MMHG

## 2021-10-07 PROCEDURE — 400013 HH SOC

## 2021-10-07 PROCEDURE — G0151 HHCP-SERV OF PT,EA 15 MIN: HCPCS

## 2021-10-12 ENCOUNTER — HOME CARE VISIT (OUTPATIENT)
Dept: SCHEDULING | Facility: HOME HEALTH | Age: 66
End: 2021-10-12
Payer: COMMERCIAL

## 2021-10-12 VITALS
HEART RATE: 78 BPM | SYSTOLIC BLOOD PRESSURE: 112 MMHG | OXYGEN SATURATION: 98 % | TEMPERATURE: 97.8 F | RESPIRATION RATE: 18 BRPM | DIASTOLIC BLOOD PRESSURE: 68 MMHG

## 2021-10-12 PROCEDURE — G0151 HHCP-SERV OF PT,EA 15 MIN: HCPCS

## 2021-10-14 ENCOUNTER — HOME CARE VISIT (OUTPATIENT)
Dept: SCHEDULING | Facility: HOME HEALTH | Age: 66
End: 2021-10-14
Payer: COMMERCIAL

## 2021-10-14 VITALS
RESPIRATION RATE: 16 BRPM | OXYGEN SATURATION: 98 % | SYSTOLIC BLOOD PRESSURE: 135 MMHG | HEART RATE: 61 BPM | DIASTOLIC BLOOD PRESSURE: 82 MMHG | TEMPERATURE: 97.5 F

## 2021-10-14 PROCEDURE — G0157 HHC PT ASSISTANT EA 15: HCPCS

## 2021-10-18 ENCOUNTER — HOME CARE VISIT (OUTPATIENT)
Dept: SCHEDULING | Facility: HOME HEALTH | Age: 66
End: 2021-10-18
Payer: COMMERCIAL

## 2021-10-18 VITALS
HEART RATE: 74 BPM | OXYGEN SATURATION: 98 % | SYSTOLIC BLOOD PRESSURE: 102 MMHG | TEMPERATURE: 98 F | RESPIRATION RATE: 18 BRPM | DIASTOLIC BLOOD PRESSURE: 68 MMHG

## 2021-10-18 PROCEDURE — G0157 HHC PT ASSISTANT EA 15: HCPCS

## 2021-10-20 ENCOUNTER — HOME CARE VISIT (OUTPATIENT)
Dept: SCHEDULING | Facility: HOME HEALTH | Age: 66
End: 2021-10-20
Payer: COMMERCIAL

## 2021-10-20 VITALS
TEMPERATURE: 98.9 F | SYSTOLIC BLOOD PRESSURE: 120 MMHG | RESPIRATION RATE: 18 BRPM | OXYGEN SATURATION: 97 % | DIASTOLIC BLOOD PRESSURE: 82 MMHG | HEART RATE: 82 BPM

## 2021-10-20 PROCEDURE — G0157 HHC PT ASSISTANT EA 15: HCPCS

## 2021-10-25 ENCOUNTER — HOME CARE VISIT (OUTPATIENT)
Dept: SCHEDULING | Facility: HOME HEALTH | Age: 66
End: 2021-10-25
Payer: COMMERCIAL

## 2021-10-25 VITALS
OXYGEN SATURATION: 99 % | TEMPERATURE: 98 F | SYSTOLIC BLOOD PRESSURE: 106 MMHG | HEART RATE: 73 BPM | RESPIRATION RATE: 17 BRPM | DIASTOLIC BLOOD PRESSURE: 72 MMHG

## 2021-10-25 PROCEDURE — G0157 HHC PT ASSISTANT EA 15: HCPCS

## 2021-10-27 ENCOUNTER — HOME CARE VISIT (OUTPATIENT)
Dept: SCHEDULING | Facility: HOME HEALTH | Age: 66
End: 2021-10-27
Payer: COMMERCIAL

## 2021-10-27 VITALS
OXYGEN SATURATION: 98 % | HEART RATE: 78 BPM | SYSTOLIC BLOOD PRESSURE: 124 MMHG | DIASTOLIC BLOOD PRESSURE: 82 MMHG | TEMPERATURE: 98.2 F

## 2021-10-27 PROCEDURE — G0151 HHCP-SERV OF PT,EA 15 MIN: HCPCS

## 2022-02-09 ENCOUNTER — HOSPITAL ENCOUNTER (OUTPATIENT)
Dept: MAMMOGRAPHY | Age: 67
Discharge: HOME | End: 2022-02-09
Attending: OBSTETRICS & GYNECOLOGY
Payer: COMMERCIAL

## 2022-02-09 DIAGNOSIS — M85.80 OSTEOPENIA, UNSPECIFIED LOCATION: ICD-10-CM

## 2022-02-09 PROCEDURE — 77080 DXA BONE DENSITY AXIAL: CPT

## 2022-02-20 NOTE — PROGRESS NOTES
Notify patient, DEXA shows a 5% decrease in bone density at the spine and hips, puts her in the osteoporosis range. Needs to see a rheumatologist to discuss, please refer. 10-year probability of major fracture/hip fracture is still low. Advise Glendy to engage in  weightbearing exercise at least 3d/wk, let her know it is important for maintaining bone density/reducing fracture risk. Thanks.

## 2022-04-11 ENCOUNTER — NURSE TRIAGE (OUTPATIENT)
Dept: OTHER | Facility: CLINIC | Age: 67
End: 2022-04-11

## 2022-04-11 ENCOUNTER — HOSPITAL ENCOUNTER (EMERGENCY)
Age: 67
Discharge: HOME | End: 2022-04-11
Attending: EMERGENCY MEDICINE
Payer: COMMERCIAL

## 2022-04-11 VITALS
HEIGHT: 67 IN | RESPIRATION RATE: 16 BRPM | TEMPERATURE: 98.2 F | HEART RATE: 66 BPM | DIASTOLIC BLOOD PRESSURE: 75 MMHG | SYSTOLIC BLOOD PRESSURE: 136 MMHG | OXYGEN SATURATION: 97 % | BODY MASS INDEX: 21.97 KG/M2 | WEIGHT: 140 LBS

## 2022-04-11 DIAGNOSIS — S39.012A BACK STRAIN, INITIAL ENCOUNTER: Primary | ICD-10-CM

## 2022-04-11 LAB
APPEARANCE UR: CLEAR
BILIRUB UR QL: NEGATIVE
COLOR UR: YELLOW
GLUCOSE UR STRIP.AUTO-MCNC: NEGATIVE MG/DL
HGB UR QL STRIP: NEGATIVE
KETONES UR QL STRIP.AUTO: NEGATIVE MG/DL
LEUKOCYTE ESTERASE UR QL STRIP.AUTO: NEGATIVE
NITRITE UR QL STRIP.AUTO: NEGATIVE
PH UR STRIP: 5 [PH] (ref 5–9)
PROT UR STRIP-MCNC: NEGATIVE MG/DL
SP GR UR REFRACTOMETRY: 1.01 (ref 1–1.02)
UROBILINOGEN UR QL STRIP.AUTO: 0.2 EU/DL (ref 0.2–1)

## 2022-04-11 PROCEDURE — 81003 URINALYSIS AUTO W/O SCOPE: CPT

## 2022-04-11 PROCEDURE — 99283 EMERGENCY DEPT VISIT LOW MDM: CPT

## 2022-04-11 RX ORDER — NAPROXEN SODIUM 550 MG/1
550 TABLET ORAL
Qty: 20 TABLET | Refills: 0 | Status: SHIPPED | OUTPATIENT
Start: 2022-04-11

## 2022-04-11 NOTE — ED TRIAGE NOTES
Pt presents to ED c/o left mid back pain since last night. Pt states she was doing gardening over the weekend and thinks she might have strained a muscle. Pt states she was a little nauseous but thinks that was related to allergies. Pt states she has taken tylenol with little relief. Masked.

## 2022-04-11 NOTE — DISCHARGE INSTRUCTIONS
Take medications as prescribed  Expect to have some stiffness and soreness  Follow-up with your primary care physician  Return to the ER for any new, worsening or life-threatening symptom

## 2022-04-11 NOTE — ED NOTES
I have reviewed discharge instructions with the patient. The patient verbalized understanding. Patient left ED via Discharge Method: ambulatory to Home with . Opportunity for questions and clarification provided. Patient given 1 scripts. To continue your aftercare when you leave the hospital, you may receive an automated call from our care team to check in on how you are doing. This is a free service and part of our promise to provide the best care and service to meet your aftercare needs.  If you have questions, or wish to unsubscribe from this service please call [phone]. Thank you for Choosing our [facility] Department.

## 2022-04-11 NOTE — ED PROVIDER NOTES
Patient presents ER with complaints of right back pain. Patient states symptoms started a couple days ago at the garden. Describes an achy pain. States pain is made worse with twisting and certain positions. However today she did feel somewhat nauseous. Called her PCP who told her to come to the ER. Denies any direct trauma. Denies any chest pain or shortness of breath. Denies any fevers or chills. Denies any obvious urinary symptoms. The history is provided by the patient. Back Pain   This is a new problem. The current episode started yesterday. The problem has not changed since onset. The pain is present in the right side and middle back. The quality of the pain is described as aching. The pain is at a severity of 2/10. The pain is moderate. Pertinent negatives include no chest pain, no abdominal pain and no weakness. She has tried nothing for the symptoms.         Past Medical History:   Diagnosis Date    Acne rosacea     Anxiety     Arthritis     Dysplasia of cervix     Dysplasia of vagina     Enlarged thyroid 8/7/2016    referred to ENT    H/O bone density study 1/16/2013    Osteopenia    H/O colonoscopy 2011    per pt R due 2021    Insomnia     Marijuana user     weekly    Seasonal allergic rhinitis        Past Surgical History:   Procedure Laterality Date    HX APPENDECTOMY      HX BREAST BIOPSY Left 2010    HX COLONOSCOPY  approximately 2011     repeat in 10 yrs per pt    HX HIP REPLACEMENT Right 10/06/2021    HX LEEP PROCEDURE  2011    [place] BREAST SURGERY PROCEDURE UNLISTED      left breast biopsy         Family History:   Problem Relation Age of Onset    Prostate Cancer Father         alive and well    Dementia Father     Atrial Fibrillation Father     Cancer Paternal Grandfather         Liver cancer ~ [de-identified] Cancer Paternal Aunt         Pancreatic cancer ~ 62s    No Known Problems Mother     Breast Cancer Neg Hx     Ovarian Cancer Neg Hx     Colon Cancer Neg Hx     Pulmonary Embolism Neg Hx     Deep Vein Thrombosis Neg Hx     Thyroid Disease Neg Hx     Thyroid Cancer Neg Hx     Osteoporosis Neg Hx        Social History     Socioeconomic History    Marital status:      Spouse name: Not on file    Number of children: Not on file    Years of education: Not on file    Highest education level: Not on file   Occupational History    Occupation: retired   Tobacco Use    Smoking status: Former Smoker     Packs/day: 0.25     Years: 5.00     Pack years: 1.25     Quit date: 1987     Years since quittin.5    Smokeless tobacco: Never Used    Tobacco comment: 1 or 2 cigarettes; social   Vaping Use    Vaping Use: Never used   Substance and Sexual Activity    Alcohol use: Yes     Alcohol/week: 3.0 standard drinks     Types: 1 Glasses of wine, 2 Shots of liquor per week     Comment: 2 stiff cocktails daily; vodka    Drug use: Yes     Frequency: 1.0 times per week     Types: Marijuana    Sexual activity: Yes     Partners: Male   Other Topics Concern    Not on file   Social History Narrative    1. Has a 26 yo son-works in [employer], daughter is 26 yo, studying [employer] (). 2.  Daughter, Tahir Contreras is my pt ([id number]). She moved to [place] for work (outside [employer]). 3.  cervical exam is tricky, Cervix is very short, angles down, posterior portion almost flush w/ the upper fornix, stenotic, pt v apprehensive w/SSE, had burning, stinging pain w/ lugols, premedicate w/ valium prior to procedure, had moderate discomfort and high anxiety during colpo. 4.  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 Last name \"Olive-kam-ee\"        6. Vit D 37 ( )        7. Dr Long Rabago manages LP/TSH.   Derm Dr. Oumou Marrero         Social Determinants of Health     Financial Resource Strain:  Difficulty of Paying Living Expenses: Not on file   Food Insecurity:     Worried About Running Out of Food in the Last Year: Not on file    Ran Out of Food in the Last Year: Not on file   Transportation Needs:     Lack of Transportation (Medical): Not on file    Lack of Transportation (Non-Medical): Not on file   Physical Activity:     Days of Exercise per Week: Not on file    Minutes of Exercise per Session: Not on file   Stress:     Feeling of Stress : Not on file   Social Connections:     Frequency of Communication with Friends and Family: Not on file    Frequency of Social Gatherings with Friends and Family: Not on file    Attends Temple Services: Not on file    Active Member of [address] [place] [address] or Organizations: Not on file    Attends Club or Organization Meetings: Not on file    Marital Status: Not on file   Intimate Partner Violence:     Fear of Current or Ex-Partner: Not on file    Emotionally Abused: Not on file    Physically Abused: Not on file    Sexually Abused: Not on file   Housing Stability:     Unable to Pay for Housing in the Last Year: Not on file    Number of [place] in the Last Year: Not on file    Unstable Housing in the Last Year: Not on file         ALLERGIES: Latex, Iodine, and Lidocaine-epinephrine    Review of Systems   Constitutional: Negative for diaphoresis and fatigue. HENT: Negative for congestion, dental problem, trouble swallowing and voice change. Eyes: Negative for photophobia and redness. Respiratory: Negative for choking and chest tightness. Cardiovascular: Negative for chest pain and palpitations. Gastrointestinal: Positive for nausea. Negative for abdominal pain and vomiting. Endocrine: Negative for polyuria. Genitourinary: Negative for decreased urine volume, flank pain, frequency and urgency. Musculoskeletal: Positive for back pain. Negative for myalgias and neck pain. Skin: Negative for color change.    Neurological: Negative for seizures, syncope and weakness. Hematological: Negative for adenopathy. Does not bruise/bleed easily. Psychiatric/Behavioral: Negative for behavioral problems and confusion. All other systems reviewed and are negative. Vitals:    04/11/22 1557   BP: 136/75   Pulse: 66   Resp: 16   Temp: 98.2 °F (36.8 °C)   SpO2: 97%   Weight: 63.5 kg (140 lb)   Height: 5' 7\" (1.702 m)            Physical Exam  Vitals and nursing note reviewed. Constitutional:       General: She is not in acute distress. Appearance: Normal appearance. She is not ill-appearing. HENT:      Head: Normocephalic and atraumatic. Right Ear: External ear normal.      Left Ear: External ear normal.      Nose: Nose normal. No congestion or rhinorrhea. Mouth/Throat:      Pharynx: No oropharyngeal exudate or posterior oropharyngeal erythema. Eyes:      General:         Right eye: No discharge. Left eye: No discharge. Extraocular Movements: Extraocular movements intact. Pupils: Pupils are equal, round, and reactive to light. Cardiovascular:      Rate and Rhythm: Normal rate and regular rhythm. Pulses: Normal pulses. Heart sounds: Normal heart sounds. Pulmonary:      Effort: Pulmonary effort is normal. No respiratory distress. Breath sounds: Normal breath sounds. Abdominal:      General: Abdomen is flat. There is no distension. Palpations: There is no mass. Tenderness: There is no abdominal tenderness. Musculoskeletal:         General: No swelling, tenderness, deformity or signs of injury. Normal range of motion. Cervical back: Normal range of motion and neck supple. Back:    Skin:     General: Skin is warm. Capillary Refill: Capillary refill takes less than 2 seconds. Coloration: Skin is not jaundiced or pale. Findings: No bruising. Neurological:      General: No focal deficit present.       Mental Status: She is alert and oriented to person, place, and time.      Cranial Nerves: No cranial nerve deficit. Sensory: No sensory deficit. Motor: No weakness. Psychiatric:         Mood and Affect: Mood normal.         Behavior: Behavior normal.          MDM  Number of Diagnoses or Management Options  Diagnosis management comments: Fairly well-appearing here however given report nausea vomiting urinalysis here. 4:43 PM  Normal urinalysis here. Plan to discharge given musculoskeletal back pain precautions. Will start naproxen. Encourage follow-up with PCP       Amount and/or Complexity of Data Reviewed  Clinical lab tests: ordered and reviewed    Risk of Complications, Morbidity, and/or Mortality  Presenting problems: low  Diagnostic procedures: low  Management options: low    Patient Progress  Patient progress: stable         Procedures          Results Include:    Recent Results (from the past 24 hour(s))   URINALYSIS W/ RFLX MICROSCOPIC    Collection Time: 04/11/22  4:21 PM   Result Value Ref Range    Color YELLOW      Appearance CLEAR      Specific gravity 1.015 1.001 - 1.023      pH (UA) 5.0 5.0 - 9.0      Protein Negative NEG mg/dL    Glucose Negative mg/dL    Ketone Negative NEG mg/dL    Bilirubin Negative NEG      Blood Negative NEG      Urobilinogen 0.2 0.2 - 1.0 EU/dL    Nitrites Negative NEG      Leukocyte Esterase Negative NEG       Voice dictation software was used during the making of this note. This software is not perfect and grammatical and other typographical errors may be present. This note has been proofread, but may still contain errors. Derek Dodd MD; 4/11/2022 @4:43 PM   ===================================================================    I wore appropriate PPE throughout this patient's ED visit.  Derek Dodd MD, 4:43 PM

## 2022-04-11 NOTE — TELEPHONE ENCOUNTER
Received call from Carole Brown at [facility] with [employer]. Subjective: Caller states Francisco Zarate thinks she has a kidney infection, she has flank pain on the right side, nausea\"     Current Symptoms: feels tired as well, face feels hot, abdominal pain as well    Onset: 1 day ago; worsening    Associated Symptoms: reduced fluid intake    Pain Severity: 6/10; aching; constant    Temperature: denies fever n/a    What has been tried: tylenol    LMP: NA Pregnant: NA    Recommended disposition: Go to ED Now    Care advice provided, patient verbalizes understanding; denies any other questions or concerns; instructed to call back for any new or worsening symptoms. Writer provided warm transfer to Merry at [facility] for further assistance    Attention Provider: Thank you for allowing me to participate in the care of your patient. The patient was connected to triage in response to information provided to the ECC. Please do not respond through this encounter as the response is not directed to a shared pool. Reason for Disposition   Abdominal pain and age > 60 years    Protocols used:  FLANK PAIN-ADULT-[place]

## 2022-04-15 ENCOUNTER — HOSPITAL ENCOUNTER (OUTPATIENT)
Dept: CT IMAGING | Age: 67
Discharge: HOME | End: 2022-04-15
Attending: FAMILY MEDICINE

## 2022-04-15 DIAGNOSIS — E78.5 HYPERLIPIDEMIA, UNSPECIFIED HYPERLIPIDEMIA TYPE: ICD-10-CM

## 2022-04-15 NOTE — PROGRESS NOTES
Per Dr. Radha Hope: Shows moderate blockage. Start Lipitor 20mg #90 1 po every day RFx3 to prevent further.- Notified via [employer] message. Orders placed.

## 2022-06-09 DIAGNOSIS — E78.5 HYPERLIPIDEMIA, UNSPECIFIED HYPERLIPIDEMIA TYPE: Primary | ICD-10-CM

## 2022-06-23 ENCOUNTER — HOSPITAL ENCOUNTER (OUTPATIENT)
Dept: LAB | Age: 67
Discharge: HOME | End: 2022-06-26

## 2022-06-23 PROCEDURE — 88305 TISSUE EXAM BY PATHOLOGIST: CPT

## 2022-08-22 RX ORDER — CITALOPRAM 20 MG/1
20 TABLET ORAL DAILY
Qty: 90 TABLET | Refills: 3 | Status: SHIPPED | OUTPATIENT
Start: 2022-08-22

## 2022-08-25 ENCOUNTER — NURSE TRIAGE (OUTPATIENT)
Dept: OTHER | Facility: CLINIC | Age: 67
End: 2022-08-25

## 2022-08-25 NOTE — TELEPHONE ENCOUNTER
Received call from [facility] at [facility] with e(ye)BRAIN. Subjective: Caller states \"Dizziness \"     Current Symptoms: Didn't feel good Monday, no energy. Yesterday afternoon had watery diarrhea x 20 to 30 times, last time half hour ago, lightheaded started today especially if standing a while nauseous and lightheaded. Abdominal cramps comes and goes. Mouth feels dry. Headache. Onset: 3 days ago; slow, worsening    Associated Symptoms: NA    Pain Severity: 2/10; cramping; intermittent    Temperature: Unable to check, caller doesn't think has a fever but does feel a bit on the warm side    What has been tried: N/A    LMP: NA Pregnant: NA    Recommended disposition: Go to ED/UCC Now (Or to Office with PCP Approval), Unable to connect with Gimmie for second level triage with multiple attemps by SELINA Martinez at [facility],  RN advised caller to go to [address] [place] [address] now. Caller states will go to [facility] in the afternoon when  gets home,  RN Reiterated my disposition to the patient and expressed concern for the patient's well-being. Caller verbalized understanding and request to speak to [facility] ([facility]) for appointment with PCP as follow up to [facility] visit this afternoon. Care advice provided, patient verbalizes understanding; denies any other questions or concerns; instructed to call back for any new or worsening symptoms. Callers states will go to nearest [facility] when  is home. Patient/Caller agrees with recommended disposition; writer provided warm transfer to [email or web address] at [facility] for appointment scheduling. Attention Provider: Thank you for allowing me to participate in the care of your patient. The patient was connected to triage in response to information provided to the ECC/PSC. Please do not respond through this encounter as the response is not directed to a shared pool.           Reason for Disposition   Diarrhea is main symptom   SEVERE diarrhea (e.g., 7 or more times / day more than normal) and age > 60 years    Protocols used: Dizziness-ADULT-OH, Diarrhea-ADULT-OH

## 2024-11-12 NOTE — H&P
Patient ID:  Elizabeth Quinones  [id number]  86 y.o.  1955    Today: October 6, 2021       CC:  Right hip pain    HPI:   The patient has  end stage arthritis of the right hip. The patient was evaluated and examined in the office prior to today and was found to have a history on physical exam consistent with intra-articular pathology of the right hip. Patient complains of anterior groin pain predominately. Pain occurs during activity. Patient has difficulty putting on socks/shoes and has notable pain when getting up from a chair and getting out of a car. Patient does not complain of significant lateral hip pain or radicular pain down the leg. There have been no changes to the patient's orthopedic condition since the last office visit    Past Medical History:  Past Medical History:   Diagnosis Date    Acne rosacea     Anxiety     Arthritis     Dysplasia of cervix     Dysplasia of vagina     Enlarged thyroid 8/7/2016    referred to ENT    H/O bone density study 1/16/2013    Osteopenia    H/O colonoscopy 2011    per pt R due 2021    Insomnia     Marijuana user     weekly    Seasonal allergic rhinitis        Past Surgical History:  Past Surgical History:   Procedure Laterality Date    HX APPENDECTOMY      HX BREAST BIOPSY Left 2010    HX COLONOSCOPY  approximately 2011     repeat in 10 yrs per pt    HX LEEP PROCEDURE  2011    [place] BREAST SURGERY PROCEDURE UNLISTED      left breast biopsy        Medications:     Prior to Admission medications    Medication Sig Start Date End Date Taking? Authorizing Provider   acetaminophen (TYLENOL) 500 mg tablet Take 2 Tablets by mouth every six (6) hours. 10/1/21  Yes Mac Bautista MD   citalopram (CELEXA) 20 mg tablet TAKE 1 TABLET DAILY  Patient taking differently: Take 20 mg by mouth daily. TAKE 1 TABLET DAILY; Take / use AM day of surgery  per anesthesia protocols.   Indications: repeated episodes of anxiety 2/8/21  Yes Gilda Pond MD   fexofenadine Report given to Babar Montejo RN. Discharge summary given to patient daughter. Patient IV removed. Patient left by ambulance to Babar Montejo. Belongings with patient.    (ALLEGRA) 180 mg tablet Take 180 mg by mouth daily. Take / use AM day of surgery  per anesthesia protocols. Indications: seasonal runny nose   Yes Provider, Historical   aspirin delayed-release 81 mg tablet Take 1 Tablet by mouth two (2) times a day. 10/1/21   Ruth Aguilar MD   cyclobenzaprine (FLEXERIL) 5 mg tablet Take 1 Tablet by mouth three (3) times daily as needed for Muscle Spasm(s). 10/1/21   Ruth Aguilar MD   gabapentin (NEURONTIN) 100 mg capsule Take 1 Capsule by mouth two (2) times a day. 10/1/21   Ruth Aguilar MD   HYDROmorphone (DILAUDID) 2 mg tablet Take 1 Tablet by mouth every four (4) hours as needed for Pain for up to 7 days. Max Daily Amount: 12 mg. 10/1/21 10/8/21  Ruth Aguilar MD   meloxicam (MOBIC) 15 mg tablet Take 1 Tablet by mouth daily. 10/1/21   Ruth Aguilar MD   omeprazole (PRILOSEC) 40 mg capsule Take 1 Capsule by mouth daily. 10/1/21   Ruth Aguilar MD   ondansetron (ZOFRAN ODT) 8 mg disintegrating tablet Take 1 Tablet by mouth every eight (8) hours as needed for Nausea or Vomiting. 10/1/21   Ruth Aguilar MD   senna-docusate (PERICOLACE) 8.6-50 mg per tablet Take 1 Tablet by mouth daily. 10/1/21   Ruth Aguilar MD   zolpidem (Ambien) 10 mg tablet Take 0.5-1 Tabs by mouth nightly as needed for Sleep. Max Daily Amount: 10 mg. 3/8/21   Mickey Tovar MD   aspirin 81 mg chewable tablet Take 81 mg by mouth daily. Indications: stroke prevention    Provider, Historical   azelaic acid (FINACEA) 15 % topical gel Apply  to affected area two (2) times a day. Indications: a skin condition on the cheeks and nose with a reddish rash and acne called acne rosacea 1/16/19   Provider, Historical   metroNIDAZOLE (METROGEL) 1 % topical gel Apply  to affected area daily.  Indications: a skin condition on the cheeks and nose with a reddish rash and acne called acne rosacea 4/6/16   Provider, Historical       Family History:     Family History   Problem Relation Age of Onset  Prostate Cancer Father         alive and well    Dementia Father     Atrial Fibrillation Father     Cancer Paternal Grandfather         Liver cancer ~ [de-identified] Cancer Paternal Aunt         Pancreatic cancer ~ 62s    No Known Problems Mother     Breast Cancer Neg Hx     Ovarian Cancer Neg Hx     Colon Cancer Neg Hx     Pulmonary Embolism Neg Hx     Deep Vein Thrombosis Neg Hx     Thyroid Disease Neg Hx     Thyroid Cancer Neg Hx     Osteoporosis Neg Hx        Social History:      Social History     Tobacco Use    Smoking status: Former Smoker     Packs/day: 0.25     Years: 5.00     Pack years: 1.25     Quit date: 1987     Years since quittin.0    Smokeless tobacco: Never Used    Tobacco comment: 1 or 2 cigarettes; social   Substance Use Topics    Alcohol use: Yes     Alcohol/week: 3.0 standard drinks     Types: 1 Glasses of wine, 2 Shots of liquor per week     Comment: 2 stiff cocktails daily; vodka         Allergies: Allergies   Allergen Reactions    Latex Itching    Iodine Hives     Allergic to CT dye.  Lidocaine-Epinephrine Palpitations     And heart racing. Vitals:      Visit Vitals  BP (!) 142/81 (BP 1 Location: Right upper arm, BP Patient Position: Sitting)   Pulse 68   Temp 97.5 °F (36.4 °C)   Resp 16   SpO2 98%        Objective:         General: No Acute distress                   HEENT: Normocephalic/atramatic                   Lungs:  Breathing non-labored                   Heart:   RRR                    Abdomen: soft       Extremities:  Prior exam done in office has been consistent with end-stage hip arthritis. We have noted pain with ROM of the right hip which manifests as anterior groin pain. There is decreased internal and external rotation of the affected hip. No significant pain with palpation over the greater trochanteric bursa. No radicular pain with straight leg raise. Patient walks with and antalgic gait.  Distally patient has no neurologic deficit. Patient Active Problem List   Diagnosis Code    Osteopenia M85.80    Mild dysplasia of cervix N87.0    Anxiety F41.9    Thyroid nodule E04.1       Assessment:   1. Arthritis of the Right hip    Plan:   The patient has failed previous conservative treatment for this condition. The patient has pain in the right hip which causes daily symptoms which affects the patient's activities of daily living and quality of life. The risks, benefits, alternatives and possible complications of right total hip arthroplasty have been discussed with the patient including but not limited to infection, bleeding, damage to nerves and blood vessels with particular attention given to risk of damage of the femoral, obturator, lateral femoral cutaneous, superior gluteal, inferior gluteal, and sciatic nerve, risk of dislocation, fracture both intra-op and post-op, limb length inequality, polyethylene wear, implant loosening, risk for continued pain, and risk for revision surgery secondary to but not limited to all of these discussed risks. Further we discussed risk of venous thrombo-embolism including deep vein thrombosis and pulmonary embolism despite being on prophylaxis. We have also previously discussed the potential of morbidity and mortality associated with, but not limited to, the actual surgical procedure, anesthesia, prior medical conditions, and/or the administration of medications perioperatively. I have made no guarantees to the patient regarding outcomes and the patient has voiced understanding of that. The patient has been given the opportunity to ask questions all of which have been answered and the patient wishes to proceed. The patient will be admitted the day of surgery for right total hip replacement. The patient was counseled at length about the risks of clare Covid-19 during their perioperative period and any recovery window from their procedure.   The patient was made aware that clare Covid-19  may worsen their prognosis for recovering from their procedure and lend to a higher morbidity and/or mortality risk. All material risks, benefits, and reasonable alternatives including postponing the procedure were discussed. The patient does  wish to proceed with the procedure at this time.         Signed By: Gemini Otoole MD  October 6, 2021

## (undated) DEVICE — JELLY LUBRICATING 10GM PREFIL SYR LUBE

## (undated) DEVICE — 3M™ IOBAN™ 2 ANTIMICROBIAL INCISE DRAPE 6651EZ: Brand: IOBAN™ 2

## (undated) DEVICE — SOLUTION IV 1000ML 0.9% SOD CHL

## (undated) DEVICE — TRAY PREP DRY W/ PREM GLV 2 APPL 6 SPNG 2 UNDPD 1 OVERWRAP

## (undated) DEVICE — DRAPE,U/SHT,SPLIT,FILM,60X84,STERILE: Brand: MEDLINE

## (undated) DEVICE — SYSTEM SKIN CLSR 22CM DERMBND PRINEO

## (undated) DEVICE — SOLUTION IV 250ML 0.9% SOD CHL CLR INJ FLX BG CONT PRT CLSR

## (undated) DEVICE — HANDPIECE SET WITH COAXIAL HIGH FLOW TIP AND SUCTION TUBE: Brand: INTERPULSE

## (undated) DEVICE — SUTURE MCRYL SZ 2-0 L27IN ABSRB UD CP-1 1 L36MM 1/2 CIR REV Y266H

## (undated) DEVICE — BIPOLAR SEALER 23-112-1 AQM 6.0: Brand: AQUAMANTYS ®

## (undated) DEVICE — PAD,NON-ADHERENT,3X8,STERILE,LF,1/PK: Brand: MEDLINE

## (undated) DEVICE — STOCKINETTE TUBE 6X48 -- MEDICHOICE

## (undated) DEVICE — (D)PREP SKN CHLRAPRP APPL 26ML -- CONVERT TO ITEM 371833

## (undated) DEVICE — PIN FIX TROCAR PT 1 END 9/64X9 IN 1 PT STYL SMOOTH PLN STRL
Type: IMPLANTABLE DEVICE | Site: KNEE | Status: NON-FUNCTIONAL
Removed: 2021-10-06

## (undated) DEVICE — SUTURE STRATAFIX SZ 3-0 L30CM NONABSORBABLE UD L19MM FS-2 SXMP2B408

## (undated) DEVICE — SUTURE FIBERWIRE SZ 2 W/ TAPERED NEEDLE BLUE L38IN NONABSORB BLU L26.5MM 1/2 CIRCLE AR7200

## (undated) DEVICE — 3M™ STERI-DRAPE™ INSTRUMENT POUCH 1018: Brand: STERI-DRAPE™

## (undated) DEVICE — ABDOMINAL PAD: Brand: DERMACEA

## (undated) DEVICE — TOTAL HIP DR WATSON: Brand: MEDLINE INDUSTRIES, INC.

## (undated) DEVICE — SUTURE STRATAFIX SPRL SZ 1 L5IN ABSRB VLT CT-1 L36MM 1/2 SXPD2B401

## (undated) DEVICE — SOLUTION IRRIG 3000ML 0.9% SOD CHL FLX CONT 0797208] ICU MEDICAL INC]

## (undated) DEVICE — SYR 10ML LUER LOK 1/5ML GRAD --

## (undated) DEVICE — REM POLYHESIVE ADULT PATIENT RETURN ELECTRODE: Brand: VALLEYLAB

## (undated) DEVICE — T4 HOOD

## (undated) DEVICE — GOWN,AURORA,FABRIC-REINFORCED,2XL: Brand: MEDLINE

## (undated) DEVICE — Z DISCONTINUED USE 2423037 APPLICATOR MEDICATED 3 CC CHLORHEXIDINE GLUC 2% CHLORAPREP

## (undated) DEVICE — STRYKER PERFORMANCE SERIES SAGITTAL BLADE: Brand: STRYKER PERFORMANCE SERIES